# Patient Record
Sex: FEMALE | Race: WHITE | NOT HISPANIC OR LATINO | Employment: FULL TIME | URBAN - METROPOLITAN AREA
[De-identification: names, ages, dates, MRNs, and addresses within clinical notes are randomized per-mention and may not be internally consistent; named-entity substitution may affect disease eponyms.]

---

## 2018-02-05 ENCOUNTER — OFFICE VISIT (OUTPATIENT)
Dept: INTERNAL MEDICINE | Facility: CLINIC | Age: 23
End: 2018-02-05

## 2018-02-05 VITALS
HEIGHT: 63 IN | TEMPERATURE: 98 F | SYSTOLIC BLOOD PRESSURE: 110 MMHG | OXYGEN SATURATION: 98 % | HEART RATE: 82 BPM | BODY MASS INDEX: 22.86 KG/M2 | WEIGHT: 129 LBS | DIASTOLIC BLOOD PRESSURE: 70 MMHG

## 2018-02-05 DIAGNOSIS — J06.9 ACUTE URI: ICD-10-CM

## 2018-02-05 DIAGNOSIS — M25.511 ACUTE PAIN OF RIGHT SHOULDER: Primary | ICD-10-CM

## 2018-02-05 DIAGNOSIS — R10.33 PERIUMBILICAL ABDOMINAL PAIN: ICD-10-CM

## 2018-02-05 PROBLEM — L70.9 ACNE: Status: ACTIVE | Noted: 2018-02-05

## 2018-02-05 PROBLEM — J30.1 CHRONIC SEASONAL ALLERGIC RHINITIS DUE TO POLLEN: Status: ACTIVE | Noted: 2018-02-05

## 2018-02-05 PROCEDURE — 99214 OFFICE O/P EST MOD 30 MIN: CPT | Performed by: FAMILY MEDICINE

## 2018-02-05 RX ORDER — MONTELUKAST SODIUM 10 MG/1
10 TABLET ORAL
COMMUNITY
End: 2018-02-05

## 2018-02-05 RX ORDER — CETIRIZINE HYDROCHLORIDE 10 MG/1
TABLET ORAL
COMMUNITY

## 2018-02-05 RX ORDER — SPIRONOLACTONE 100 MG/1
100 TABLET, FILM COATED ORAL
COMMUNITY

## 2018-02-05 RX ORDER — NORGESTIMATE AND ETHINYL ESTRADIOL 0.25-0.035
1 KIT ORAL
COMMUNITY
End: 2020-05-04

## 2018-02-05 RX ORDER — FLUTICASONE PROPIONATE 50 MCG
2 SPRAY, SUSPENSION (ML) NASAL DAILY
COMMUNITY

## 2018-02-05 NOTE — PROGRESS NOTES
Subjective   Kathya Ho is a 22 y.o. female.   Chief Complaint   Patient presents with   • Shoulder Pain   • Facial Pain   • Nasal Congestion   • Abdominal Pain       History of Present Illness     This is new patient in our office.     #1 right shoulder pain-started 2 weeks ago.  Patient dislocated her right shoulder when she was doing weights.  She was able to get it back in place.  She has pain on and off which is achy, intensity 5 out of 10.  It is localized in the anterior arm.  It is worse with repetitive movements and when she carries her backpack.  It is not radiating.  This is the second time when she dislocated her shoulder.  Last time it was 3 years ago also when she was doing weights.    #2 abdominal pain-started 3 years ago when she started college.  It is on and off, it is localized around umbilicus.  It is achy, lasts hours, intensity 5 out of 10.  Sometimes it is associated with nausea but no vomiting.  Bowel movements are normal.  She tried Pepto-Bismol and sometimes it helped sometimes not.  She used medication for heartburn (NOS) one time and it didn't help.  Pain is not related to specific activity, sometimes it is related to stress.  Sometimes it is related to eating but not always.     #3 sinus pressure, left sided nasal drainage which is clear, sometimes yellow.  Started 3 days ago.  No other symptoms associated.  No fever, no chills, no cough.  Patient tried Flonase and it helped a little.    For more history please see health history form which was reviewed by me and will be scanned.    The following portions of the patient's history were reviewed and updated as appropriate: allergies, current medications, past family history, past medical history, past social history, past surgical history and problem list.    Review of Systems   Constitutional: Negative for chills and fever.   HENT: Positive for congestion and sinus pressure. Negative for ear pain and sore throat.    Respiratory:  Negative for cough.    Cardiovascular: Negative.    Gastrointestinal: Positive for abdominal pain and nausea. Negative for blood in stool, constipation and diarrhea.   Genitourinary: Negative.    Neurological: Negative.    Hematological: Negative.          Objective   Wt Readings from Last 3 Encounters:   02/05/18 58.5 kg (129 lb)   11/12/16 55.3 kg (122 lb) (38 %, Z= -0.30)*   09/09/16 55.8 kg (123 lb) (41 %, Z= -0.23)*     * Growth percentiles are based on [facility] 2-20 Years data.      Vitals:    02/05/18 1043   BP: 110/70   Pulse: 82   Temp: 98 °F (36.7 °C)   SpO2: 98%     Temp Readings from Last 3 Encounters:   02/05/18 98 °F (36.7 °C)   11/12/16 97.5 °F (36.4 °C) (Oral)   09/09/16 98.2 °F (36.8 °C) (Temporal Artery )     BP Readings from Last 3 Encounters:   02/05/18 110/70   11/12/16 124/70   09/09/16 106/64     Pulse Readings from Last 3 Encounters:   02/05/18 82   11/12/16 90   09/09/16 88       Physical Exam   Constitutional: She is oriented to person, place, and time. She appears well-developed and well-nourished.   HENT:   Head: Normocephalic and atraumatic.   Neck: Neck supple. Carotid bruit is not present. No thyromegaly present.   Cardiovascular: Normal rate, regular rhythm and normal heart sounds.    Pulmonary/Chest: Effort normal and breath sounds normal.   Abdominal: Soft. She exhibits no distension and no mass. There is no tenderness. No hernia.   Musculoskeletal:   Limit of extension in the right shoulder.  Mm strength 5/5 x UE. Mild TTP over Rt shoulder area.   Neurological: She is alert and oriented to person, place, and time.   Skin: Skin is warm, dry and intact.   Psychiatric: She has a normal mood and affect. Her behavior is normal.       Assessment/Plan   Kathya was seen today for shoulder pain, facial pain, nasal congestion and abdominal pain.    Diagnoses and all orders for this visit:    Acute pain of right shoulder  -     Ambulatory Referral to Physical Therapy    Periumbilical abdominal  pain    Acute URI        #1 right shoulder pain-referral to physical therapist.  Return to office if symptoms are not resolved with treatment, or sooner if worsening.      #2 abdominal pain-regular meals, start probiotics.  Stress relief techniques.  Return to clinic if symptoms are not better with treatment in 1 month, or sooner if worsening.      #3 upper respiratory infection-Mucinex 1200 mg twice a day.  Fluids.  Return to clinic if symptoms not better with treatment, or sooner if worsening.

## 2018-04-18 ENCOUNTER — TELEPHONE (OUTPATIENT)
Dept: INTERNAL MEDICINE | Facility: CLINIC | Age: 23
End: 2018-04-18

## 2018-04-18 ENCOUNTER — OFFICE VISIT (OUTPATIENT)
Dept: INTERNAL MEDICINE | Facility: CLINIC | Age: 23
End: 2018-04-18

## 2018-04-18 VITALS
BODY MASS INDEX: 22.15 KG/M2 | DIASTOLIC BLOOD PRESSURE: 70 MMHG | WEIGHT: 125 LBS | HEART RATE: 108 BPM | OXYGEN SATURATION: 98 % | TEMPERATURE: 98.8 F | HEIGHT: 63 IN | SYSTOLIC BLOOD PRESSURE: 108 MMHG

## 2018-04-18 DIAGNOSIS — R10.9 RIGHT SIDED ABDOMINAL PAIN: Primary | ICD-10-CM

## 2018-04-18 LAB

## 2018-04-18 PROCEDURE — 99213 OFFICE O/P EST LOW 20 MIN: CPT | Performed by: NURSE PRACTITIONER

## 2018-04-18 PROCEDURE — 81025 URINE PREGNANCY TEST: CPT | Performed by: NURSE PRACTITIONER

## 2018-04-18 PROCEDURE — 81003 URINALYSIS AUTO W/O SCOPE: CPT | Performed by: NURSE PRACTITIONER

## 2018-04-18 NOTE — PROGRESS NOTES
"Subjective   Kathya Ho is a 23 y.o. female here for right side abdominal pain x1 week.    History of Present Illness   The patient is here today with complaints of RLQ pain. This is different from her abd achiness from a few months ago. This improved with decreasing lactose exposure. This pain has been a dull achy pain but today she went to do some exercise on a ball and could not do that due to abd pain. Most of the time next to belly button. Denies pain btw shoulder blades. Area is tender to the touch.     Periods are regular and no problems. Moderate flow.     Mom with GB removal.     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   The following portions of the patient's history were reviewed and updated as appropriate: allergies, current medications, past family history, past medical history, past social history, past surgical history and problem list.    Review of Systems   Constitutional: Negative.    Respiratory: Negative.    Cardiovascular: Positive for chest pain (today when going up the stairs, she also reports a fear of heights. ).   Gastrointestinal: Positive for abdominal distention (\"a little bit\") and abdominal pain. Negative for anal bleeding, blood in stool, constipation, diarrhea, nausea and vomiting.       Objective   Physical Exam   Constitutional: She appears well-developed and well-nourished.   Neck: Normal range of motion. Neck supple. No thyromegaly present.   Cardiovascular: Normal rate, regular rhythm, normal heart sounds and intact distal pulses.    Pulmonary/Chest: Effort normal and breath sounds normal.   Abdominal: Soft. Normal appearance, normal aorta and bowel sounds are normal. There is tenderness in the right lower quadrant and periumbilical area.       abd most tender at right side near umbulicus   Skin: Skin is warm and dry.   Psychiatric: She has a normal mood and affect. Her behavior is normal. Judgment and thought content normal.       Assessment/Plan   There " are no diagnoses linked to this encounter.    1. Right sided abd pain- DD, colitis, appendicitis, functional bowel disorder, check CT abd/pelvis, if neg and symptoms persist we will refer to GI. Keep diary.

## 2018-04-18 NOTE — TELEPHONE ENCOUNTER
----- Message from Cecille Dugan sent at 4/18/2018  3:10 PM EDT -----  Patient wants to know the results of her urine test

## 2018-04-20 ENCOUNTER — HOSPITAL ENCOUNTER (OUTPATIENT)
Dept: CT IMAGING | Facility: HOSPITAL | Age: 23
Discharge: HOME | End: 2018-04-20
Admitting: NURSE PRACTITIONER

## 2018-04-20 PROCEDURE — 0 DIATRIZOATE MEGLUMINE & SODIUM PER 1 ML: Performed by: NURSE PRACTITIONER

## 2018-04-20 PROCEDURE — 25010000002 IOPAMIDOL 61 % SOLUTION: Performed by: NURSE PRACTITIONER

## 2018-04-20 PROCEDURE — 74177 CT ABD & PELVIS W/CONTRAST: CPT

## 2018-04-20 RX ADMIN — IOPAMIDOL 85 ML: 612 INJECTION, SOLUTION INTRAVENOUS at 10:21

## 2018-04-20 RX ADMIN — DIATRIZOATE MEGLUMINE AND DIATRIZOATE SODIUM 30 ML: 660; 100 LIQUID ORAL; RECTAL at 09:00

## 2018-04-24 DIAGNOSIS — R10.9 RIGHT SIDED ABDOMINAL PAIN: Primary | ICD-10-CM

## 2018-05-07 ENCOUNTER — OFFICE VISIT (OUTPATIENT)
Dept: INTERNAL MEDICINE | Facility: CLINIC | Age: 23
End: 2018-05-07

## 2018-05-07 ENCOUNTER — OFFICE VISIT (OUTPATIENT)
Dept: GASTROENTEROLOGY | Facility: CLINIC | Age: 23
End: 2018-05-07

## 2018-05-07 VITALS
WEIGHT: 127 LBS | TEMPERATURE: 97.9 F | OXYGEN SATURATION: 97 % | HEART RATE: 82 BPM | DIASTOLIC BLOOD PRESSURE: 60 MMHG | HEIGHT: 63 IN | SYSTOLIC BLOOD PRESSURE: 104 MMHG | BODY MASS INDEX: 22.5 KG/M2

## 2018-05-07 VITALS
DIASTOLIC BLOOD PRESSURE: 64 MMHG | WEIGHT: 127.4 LBS | TEMPERATURE: 98.2 F | BODY MASS INDEX: 22.57 KG/M2 | SYSTOLIC BLOOD PRESSURE: 110 MMHG | HEIGHT: 63 IN

## 2018-05-07 DIAGNOSIS — Z00.00 PHYSICAL EXAM, ANNUAL: Primary | ICD-10-CM

## 2018-05-07 DIAGNOSIS — R10.30 LOWER ABDOMINAL PAIN: Primary | ICD-10-CM

## 2018-05-07 LAB
ERYTHROCYTE [DISTWIDTH] IN BLOOD BY AUTOMATED COUNT: 12.3 % (ref 11.7–13)
HCT VFR BLD AUTO: 41.4 % (ref 35.6–45.5)
HGB BLD-MCNC: 14 G/DL (ref 11.9–15.5)
MCH RBC QN AUTO: 32.3 PG (ref 26.9–32)
MCHC RBC AUTO-ENTMCNC: 33.8 G/DL (ref 32.4–36.3)
MCV RBC AUTO: 95.4 FL (ref 80.5–98.2)
PLATELET # BLD AUTO: 322 10*3/MM3 (ref 140–500)
RBC # BLD AUTO: 4.34 10*6/MM3 (ref 3.9–5.2)
WBC # BLD AUTO: 6.51 10*3/MM3 (ref 4.5–10.7)

## 2018-05-07 PROCEDURE — 99395 PREV VISIT EST AGE 18-39: CPT | Performed by: FAMILY MEDICINE

## 2018-05-07 PROCEDURE — 90715 TDAP VACCINE 7 YRS/> IM: CPT | Performed by: FAMILY MEDICINE

## 2018-05-07 PROCEDURE — 90471 IMMUNIZATION ADMIN: CPT | Performed by: FAMILY MEDICINE

## 2018-05-07 PROCEDURE — 99203 OFFICE O/P NEW LOW 30 MIN: CPT | Performed by: INTERNAL MEDICINE

## 2018-05-07 NOTE — PROGRESS NOTES
Subjective   Kathya Ho is a 23 y.o. female.   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  She has no complaints.  She had problems with periumbilical abdominal pain last month.  She had abdominal CT which was negative.  She is scheduled today with GI.  Pain is significantly better.  Intensity from 0-1 out of 10.  She has no nausea, no vomiting.  Bowel movements are normal.  There is no change in surgical or family history from last office visit.  No new allergies to medications.  She is on no new prescription or over-the-counter medications.  She does not smoke cigaretts.  She never did.  She drinks alcohol occasionally.  She doesn't use drugs.  She exercises 3 times a week for 1 hour cardio and weights.  Dental appointments-she is scheduled today.  Eye exam was in November 2017.  She wears glasses.  She had GYN exam -pap, pelvic and breast exam by GYN in February 2018.  It was normal.  She is on birth control pills which are managed by her GYN.  She is up-to-date with vaccinations except of tetanus vaccine which was in April 2008.    The following portions of the patient's history were reviewed and updated as appropriate: allergies, current medications, past family history, past medical history, past social history, past surgical history and problem list.    Review of Systems   Constitutional: Negative.    HENT: Negative.    Respiratory: Negative.    Cardiovascular: Negative.    Gastrointestinal: Positive for abdominal pain. Negative for constipation, diarrhea, nausea and vomiting.   Genitourinary: Negative.    Neurological: Negative.    Psychiatric/Behavioral: Negative.          Objective   Wt Readings from Last 3 Encounters:   05/07/18 57.6 kg (127 lb)   04/18/18 56.7 kg (125 lb)   02/05/18 58.5 kg (129 lb)      Vitals:    05/07/18 0941   BP:  104/60   Pulse: 82   Temp: 97.9 °F (36.6 °C)   SpO2: 97%     Temp Readings from Last 3 Encounters:   05/07/18 97.9 °F (36.6 °C)   04/18/18 98.8 °F (37.1 °C)   02/05/18 98 °F (36.7 °C)     BP Readings from Last 3 Encounters:   05/07/18 104/60   04/18/18 108/70   02/05/18 110/70     Pulse Readings from Last 3 Encounters:   05/07/18 82   04/18/18 108   02/05/18 82       Physical Exam   Constitutional: She is oriented to person, place, and time. She appears well-developed and well-nourished. No distress.   HENT:   Head: Normocephalic and atraumatic. Hair is normal.   Right Ear: Hearing, tympanic membrane, external ear and ear canal normal. No drainage. No decreased hearing is noted.   Left Ear: Hearing, tympanic membrane, external ear and ear canal normal. No decreased hearing is noted.   Nose: No nasal deformity.   Mouth/Throat: Oropharynx is clear and moist.   Eyes: Conjunctivae, EOM and lids are normal. Pupils are equal, round, and reactive to light. Right eye exhibits no discharge. Left eye exhibits no discharge.   Neck: Normal range of motion. Neck supple. No JVD present. No tracheal deviation present. No thyromegaly present.   Cardiovascular: Normal rate, regular rhythm, normal heart sounds, intact distal pulses and normal pulses.  Exam reveals no gallop and no friction rub.    No murmur heard.  Pulmonary/Chest: Effort normal and breath sounds normal. No respiratory distress. She has no wheezes. She has no rales. She exhibits no tenderness.   Abdominal: Soft. She exhibits no distension and no mass. There is no tenderness. There is no rebound and no guarding. No hernia.   Musculoskeletal: Normal range of motion. She exhibits no edema, tenderness or deformity.   Lymphadenopathy:     She has no cervical adenopathy.   Neurological: She is alert and oriented to person, place, and time. She has normal reflexes. She displays normal reflexes. No cranial nerve deficit. She exhibits normal muscle tone. Coordination normal.    Skin: Skin is warm and dry. No rash noted. She is not diaphoretic. No erythema.   Psychiatric: She has a normal mood and affect. Her behavior is normal. Judgment and thought content normal.   Vitals reviewed.      Assessment/Plan   Kathya was seen today for annual exam.    Diagnoses and all orders for this visit:    Physical exam, annual  -     CBC (No Diff)        #1 CPE- Patient is getting tetanus vaccine and now she is up-to-date with vaccinations.  She is encouraged to continue regular exercise at least 5 days a week.  We are checking CBC as requested by her school.  Forms will be signed after results are back.

## 2018-05-07 NOTE — PROGRESS NOTES
Chief Complaint   Patient presents with   • Abdominal Pain       Kathya Ho is a 23 y.o. female who presents with Right lower quadrant pain that has resolved    The pain resolved after her final exams were concluded.      Abdominal Pain   This is a new problem. The current episode started more than 1 month ago. The onset quality is gradual. The problem occurs 2 to 4 times per day. The problem has been waxing and waning. The pain is located in the RLQ and periumbilical region. The pain is at a severity of 3/10. The pain is mild. The quality of the pain is colicky, cramping, aching and dull. The abdominal pain does not radiate. Pertinent negatives include no anorexia, arthralgias, belching, constipation, diarrhea, dysuria, fever, flatus, frequency, hematochezia, melena, myalgias, nausea, vomiting or weight loss. The pain is aggravated by certain positions. The pain is relieved by bowel movements and being still. She has tried nothing for the symptoms. The treatment provided significant relief. Prior diagnostic workup includes CT scan. There is no history of abdominal surgery, colon cancer, Crohn's disease, gallstones, GERD, irritable bowel syndrome, pancreatitis, PUD or ulcerative colitis.       Past Medical History:   Diagnosis Date   • Acne    • Allergic        Past Surgical History:   Procedure Laterality Date   • TONSILLECTOMY     • WISDOM TOOTH EXTRACTION      APPRX -15-19 Y/O         Current Outpatient Prescriptions:   •  cetirizine (zyrTEC) 10 MG tablet, Take  by mouth., Disp: , Rfl:   •  fluticasone (FLONASE) 50 MCG/ACT nasal spray, 2 sprays into each nostril Daily., Disp: , Rfl:   •  montelukast (SINGULAIR) 10 MG tablet, Take 10 mg by mouth every night., Disp: , Rfl:   •  norgestimate-ethinyl estradiol (ORTHO-CYCLEN) 0.25-35 MG-MCG per tablet, Take 1 tablet by mouth., Disp: , Rfl:   •  spironolactone (ALDACTONE) 100 MG tablet, Take 100 mg by mouth., Disp: , Rfl:     Allergies   Allergen Reactions   •  Amoxicillin Rash   • Penicillins Rash     FROM EARLY CHILDHOOD       Social History     Social History   • Marital status: Single     Spouse name: N/A   • Number of children: N/A   • Years of education: N/A     Occupational History   • Not on file.     Social History Main Topics   • Smoking status: Never Smoker   • Smokeless tobacco: Never Used   • Alcohol use No   • Drug use: No   • Sexual activity: Defer     Other Topics Concern   • Not on file     Social History Narrative   • No narrative on file       Family History   Problem Relation Age of Onset   • Hypertension Mother    • Hypertension Father    • Colon polyps Father    • No Known Problems Maternal Grandmother    • No Known Problems Maternal Grandfather    • Parkinsonism Paternal Grandmother    • Colon polyps Paternal Grandfather        Review of Systems   Constitutional: Negative for fever and weight loss.   Gastrointestinal: Positive for abdominal pain. Negative for anorexia, constipation, diarrhea, flatus, hematochezia, melena, nausea and vomiting.   Genitourinary: Negative for dysuria and frequency.   Musculoskeletal: Negative for arthralgias and myalgias.   All other systems reviewed and are negative.      Vitals:    05/07/18 1542   BP: 110/64   Temp: 98.2 °F (36.8 °C)       Physical Exam   Constitutional: She is oriented to person, place, and time. She appears well-developed and well-nourished.   HENT:   Head: Normocephalic and atraumatic.   Eyes: Pupils are equal, round, and reactive to light.   Cardiovascular: Normal rate, regular rhythm and normal heart sounds.    Pulmonary/Chest: Effort normal and breath sounds normal.   Abdominal: Soft. Bowel sounds are normal. She exhibits no shifting dullness, no distension, no pulsatile liver, no fluid wave, no abdominal bruit, no ascites, no pulsatile midline mass and no mass. There is no hepatosplenomegaly. There is no tenderness. There is no rigidity and no guarding. No hernia.   Musculoskeletal: Normal range  of motion.   Neurological: She is alert and oriented to person, place, and time.   Skin: Skin is warm and dry.   Psychiatric: She has a normal mood and affect. Her behavior is normal. Thought content normal.   Nursing note and vitals reviewed.    Problem list    Right lower quadrant and periumbilical abdominal pain which has resolved      Assessment/Plan    Out any alarm symptoms such as rectal bleeding or family history of IBD there is no indication for colonoscopy especially in a patient who is now symptom free  It is interesting that her symptoms resolved after conclusion of fine ralaleja in am wondering if she just had a bit of stress induced functional abdominal pain  I will prescribe Levsin to be used as needed in the future  She will begin a probiotic  She will follow-up with me as needed

## 2019-05-07 ENCOUNTER — OFFICE VISIT (OUTPATIENT)
Dept: INTERNAL MEDICINE | Facility: CLINIC | Age: 24
End: 2019-05-07

## 2019-05-07 VITALS
BODY MASS INDEX: 25.69 KG/M2 | OXYGEN SATURATION: 98 % | SYSTOLIC BLOOD PRESSURE: 120 MMHG | HEIGHT: 63 IN | DIASTOLIC BLOOD PRESSURE: 72 MMHG | HEART RATE: 96 BPM | WEIGHT: 145 LBS | TEMPERATURE: 98.4 F

## 2019-05-07 DIAGNOSIS — R00.0 TACHYCARDIA: ICD-10-CM

## 2019-05-07 DIAGNOSIS — Z00.00 PHYSICAL EXAM, ANNUAL: Primary | ICD-10-CM

## 2019-05-07 PROCEDURE — 99395 PREV VISIT EST AGE 18-39: CPT | Performed by: FAMILY MEDICINE

## 2019-05-07 PROCEDURE — 93000 ELECTROCARDIOGRAM COMPLETE: CPT | Performed by: FAMILY MEDICINE

## 2019-05-07 PROCEDURE — 99213 OFFICE O/P EST LOW 20 MIN: CPT | Performed by: FAMILY MEDICINE

## 2019-05-07 NOTE — PROGRESS NOTES
Subjective   Kathya Ho is a 24 y.o. female.   Chief Complaint   Patient presents with   • Rapid Heart Rate   • Annual Exam       History of Present Illness     Patient is here today because of fast heart rate and for complete physical exam with GYN evaluation.  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    #1 CPE-no complaints, other than fast heart rate.  There is no change in medical, surgical or family history from last office visit.  There is no change in prescription medications.  Over-the-counter she takes vitamin C as needed and calcium.  Recently she started taking CBD oil to help with relaxation and it does help her.  No change in allergies to medications.  She is allergic to penicillin.  She does not smoke cigarettes.  She drinks alcohol only occasionally -1 glass of wine a week.  No drugs.  She exercises 4 days a week for an hour.  She has dental appointments every 6 months.  Last eye exam was in January 2019.  She wears glasses.  She had GYN evaluation in February 2019 which was normal.  She had tetanus vaccine in May 2018.    #2 fast heart rate- patient noticed it in the last week of February.  She had sinus infection and she was preparing for exams.  It was midterm week.  She woke up from her sleep.  She was jittery and she checked her heart rate and it was at 170.  She did not have any other symptoms associated with it.  No chest pain, no shortness of breath, no lightheadedness.  She reports that her baseline heart rate is between 70 and 90.  She was on Z-Tiago at that time for sinus infection and the night before she had Mucinex DM.  Since then she started checking her heart rate randomly with her watch.  She noticed that it can go up to 100, and one time it was at 120 when she was sitting in her class.  Asymptomatic.  She does not have any caffeine in her diet, except for the time when she studies for exams.  Mother has hypothyroidism.    The following portions of the patient's history  were reviewed and updated as appropriate: allergies, current medications, past family history, past medical history, past social history, past surgical history and problem list.    Review of Systems   Constitutional: Negative.    HENT: Negative.    Respiratory: Negative.    Cardiovascular: Negative for chest pain.   Gastrointestinal: Negative.    Neurological: Negative.          Objective   Wt Readings from Last 3 Encounters:   05/07/19 65.8 kg (145 lb)   05/07/18 57.8 kg (127 lb 6.4 oz)   05/07/18 57.6 kg (127 lb)      Vitals:    05/07/19 0721   BP: 120/72   Pulse: 96   Temp: 98.4 °F (36.9 °C)   SpO2: 98%     Temp Readings from Last 3 Encounters:   05/07/19 98.4 °F (36.9 °C)   05/07/18 98.2 °F (36.8 °C) (Oral)   05/07/18 97.9 °F (36.6 °C)     BP Readings from Last 3 Encounters:   05/07/19 120/72   05/07/18 110/64   05/07/18 104/60     Pulse Readings from Last 3 Encounters:   05/07/19 96   05/07/18 82   04/18/18 108       Physical Exam   Constitutional: She is oriented to person, place, and time. She appears well-developed and well-nourished. No distress.   HENT:   Head: Normocephalic and atraumatic. Hair is normal.   Right Ear: Hearing, tympanic membrane, external ear and ear canal normal. No drainage. No decreased hearing is noted.   Left Ear: Hearing, tympanic membrane, external ear and ear canal normal. No decreased hearing is noted.   Nose: No nasal deformity.   Mouth/Throat: Oropharynx is clear and moist.   Eyes: Conjunctivae, EOM and lids are normal. Pupils are equal, round, and reactive to light. Right eye exhibits no discharge. Left eye exhibits no discharge.   Neck: Normal range of motion. Neck supple. No JVD present. No tracheal deviation present. No thyromegaly present.   Cardiovascular: Normal rate, regular rhythm, normal heart sounds, intact distal pulses and normal pulses. Exam reveals no gallop and no friction rub.   No murmur heard.  Pulmonary/Chest: Effort normal and breath sounds normal. No  respiratory distress. She has no wheezes. She has no rales. She exhibits no tenderness.   Abdominal: Soft. She exhibits no distension and no mass. There is no tenderness. There is no rebound and no guarding. No hernia.   Musculoskeletal: Normal range of motion. She exhibits no edema, tenderness or deformity.   Lymphadenopathy:     She has no cervical adenopathy.   Neurological: She is alert and oriented to person, place, and time. She has normal reflexes. She displays normal reflexes. No cranial nerve deficit. She exhibits normal muscle tone. Coordination normal.   Skin: Skin is warm and dry. No rash noted. She is not diaphoretic. No erythema.   Psychiatric: She has a normal mood and affect. Her behavior is normal. Judgment and thought content normal.   Vitals reviewed.      Assessment/Plan   Kathya was seen today for rapid heart rate and annual exam.    Diagnoses and all orders for this visit:    Physical exam, annual  -     CBC (No Diff)  -     Urinalysis without microscopic (no culture) - Urine, Clean Catch    Tachycardia  -     Thyroid Cascade Profile  -     ECG 12 Lead        #1 CPE-patient is up-to-date with vaccinations.  We are checking CBC and UA as requested by her school.  She will continue regular exercise.  Will complete form for school after tests are back.    2.  Fast heart rate- I think it can be secondary to Mucinex DM and caffeine that she had during exams week.  Normally she does not use any caffeine.  It was asymptomatic.  Will check TSH.  I am advising patient not to use caffeine.  EKG in the office- sinus arrhythmia, no other abnormalities, tracing and interpretation will be scanned. RTC if worsening or if any additional symptoms present.

## 2019-05-08 LAB
APPEARANCE UR: CLEAR
BILIRUB UR QL STRIP: NEGATIVE
COLOR UR: YELLOW
ERYTHROCYTE [DISTWIDTH] IN BLOOD BY AUTOMATED COUNT: 12.1 % (ref 12.3–15.4)
GLUCOSE UR QL: NEGATIVE
HCT VFR BLD AUTO: 41.2 % (ref 34–46.6)
HGB BLD-MCNC: 13.2 G/DL (ref 12–15.9)
HGB UR QL STRIP: NEGATIVE
KETONES UR QL STRIP: NEGATIVE
LEUKOCYTE ESTERASE UR QL STRIP: NEGATIVE
MCH RBC QN AUTO: 31.1 PG (ref 26.6–33)
MCHC RBC AUTO-ENTMCNC: 32 G/DL (ref 31.5–35.7)
MCV RBC AUTO: 96.9 FL (ref 79–97)
NITRITE UR QL STRIP: NEGATIVE
PH UR STRIP: 7 [PH] (ref 5–8)
PLATELET # BLD AUTO: 331 10*3/MM3 (ref 140–450)
PROT UR QL STRIP: NEGATIVE
RBC # BLD AUTO: 4.25 10*6/MM3 (ref 3.77–5.28)
SP GR UR: 1.01 (ref 1–1.03)
TSH SERPL DL<=0.005 MIU/L-ACNC: 2.26 UIU/ML (ref 0.45–4.5)
UROBILINOGEN UR STRIP-MCNC: NORMAL MG/DL
WBC # BLD AUTO: 6.25 10*3/MM3 (ref 3.4–10.8)

## 2019-08-12 ENCOUNTER — OFFICE VISIT (OUTPATIENT)
Dept: GASTROENTEROLOGY | Facility: CLINIC | Age: 24
End: 2019-08-12

## 2019-08-12 VITALS
WEIGHT: 145.6 LBS | SYSTOLIC BLOOD PRESSURE: 118 MMHG | DIASTOLIC BLOOD PRESSURE: 66 MMHG | HEIGHT: 63 IN | TEMPERATURE: 98.3 F | BODY MASS INDEX: 25.8 KG/M2

## 2019-08-12 DIAGNOSIS — R10.30 LOWER ABDOMINAL PAIN: Primary | ICD-10-CM

## 2019-08-12 PROCEDURE — 99213 OFFICE O/P EST LOW 20 MIN: CPT | Performed by: INTERNAL MEDICINE

## 2019-08-12 RX ORDER — SIMETHICONE 125 MG
125 TABLET,CHEWABLE ORAL EVERY 6 HOURS PRN
COMMUNITY
End: 2020-05-04

## 2019-08-12 RX ORDER — ASCORBIC ACID 500 MG
500 TABLET ORAL DAILY
COMMUNITY

## 2019-08-12 NOTE — PROGRESS NOTES
Chief Complaint   Patient presents with   • Abdominal Pain   • Anorexia       Kathya Ho is a  24 y.o. female here for a follow up visit for functional abdominal pain syndrome    Over the last year she has had intermittent abdominal pain which is well controlled with Levsin, likely due to stress.  She is in her clinicals from physical therapy school.  No alarm symptoms such as rectal bleeding or weight loss.  No vomiting.  Her last flare was 1 week ago it has resolved she is pain-free now        Past Medical History:   Diagnosis Date   • Acne    • Allergic rhinitis        Past Surgical History:   Procedure Laterality Date   • TONSILLECTOMY     • WISDOM TOOTH EXTRACTION      APPRX -[age] Y/O       Scheduled Meds:    Continuous Infusions:  No current facility-administered medications for this visit.     PRN Meds:.    Allergies   Allergen Reactions   • Amoxicillin Rash   • Penicillins Rash     FROM EARLY CHILDHOOD       Social History     Socioeconomic History   • Marital status: Single     Spouse name: Not on file   • Number of children: Not on file   • Years of education: Not on file   • Highest education level: Not on file   Tobacco Use   • Smoking status: Never Smoker   • Smokeless tobacco: Never Used   Substance and Sexual Activity   • Alcohol use: Yes     Comment: Social   • Drug use: No   • Sexual activity: Defer       Family History   Problem Relation Age of Onset   • Hypertension Mother    • Hypertension Father    • Colon polyps Father    • No Known Problems Maternal Grandmother    • No Known Problems Maternal Grandfather    • Parkinsonism Paternal Grandmother    • Colon polyps Paternal Grandfather        Review of Systems   Gastrointestinal: Positive for abdominal pain.   All other systems reviewed and are negative.      Vitals:    08/12/19 1418   BP: 118/66   Temp: 98.3 °F (36.8 °C)       Physical Exam   Constitutional: She is oriented to person, place, and time. She appears well-developed and  well-nourished.   HENT:   Head: Normocephalic and atraumatic.   Eyes: Pupils are equal, round, and reactive to light.   Cardiovascular: Normal rate, regular rhythm and normal heart sounds.   Pulmonary/Chest: Effort normal and breath sounds normal.   Abdominal: Soft. Bowel sounds are normal. She exhibits no shifting dullness, no distension, no pulsatile liver, no fluid wave, no abdominal bruit, no ascites, no pulsatile midline mass and no mass. There is no hepatosplenomegaly. There is tenderness. There is no rigidity and no guarding. No hernia.   Musculoskeletal: Normal range of motion.   Neurological: She is alert and oriented to person, place, and time.   Skin: Skin is warm and dry.   Psychiatric: She has a normal mood and affect. Her behavior is normal. Thought content normal.   Nursing note and vitals reviewed.    Problem list    Occasional lower abdominal pain  Functional abdominal pain syndrome      Assessment/Plan    No alarm symptoms requiring colonoscopy  CAT scan 1 year ago was normal  No need to repeat this  No need for blood testing today  Continue hyoscyamine as needed for pain with the addition of IBgard also as needed samples given  Office visit 1 year

## 2020-05-04 ENCOUNTER — OFFICE VISIT (OUTPATIENT)
Dept: INTERNAL MEDICINE | Facility: CLINIC | Age: 25
End: 2020-05-04

## 2020-05-04 VITALS
BODY MASS INDEX: 27.11 KG/M2 | WEIGHT: 153 LBS | DIASTOLIC BLOOD PRESSURE: 70 MMHG | SYSTOLIC BLOOD PRESSURE: 100 MMHG | OXYGEN SATURATION: 99 % | TEMPERATURE: 98.6 F | HEART RATE: 76 BPM | HEIGHT: 63 IN

## 2020-05-04 DIAGNOSIS — Z00.00 PHYSICAL EXAM, ANNUAL: Primary | ICD-10-CM

## 2020-05-04 DIAGNOSIS — Z00.00 ANNUAL PHYSICAL EXAM: ICD-10-CM

## 2020-05-04 LAB
B-HCG UR QL: NEGATIVE
BILIRUB BLD-MCNC: NEGATIVE MG/DL
CLARITY, POC: CLEAR
COLOR UR: YELLOW
GLUCOSE UR STRIP-MCNC: NEGATIVE MG/DL
HCT VFR BLD AUTO: 39.7 % (ref 34–46.6)
HGB BLD-MCNC: 13.8 G/DL (ref 12–15.9)
INTERNAL NEGATIVE CONTROL: NEGATIVE
INTERNAL POSITIVE CONTROL: POSITIVE
KETONES UR QL: NEGATIVE
LEUKOCYTE EST, POC: NEGATIVE
Lab: NORMAL
NITRITE UR-MCNC: NEGATIVE MG/ML
PH UR: 7 [PH] (ref 5–8)
PROT UR STRIP-MCNC: NEGATIVE MG/DL
RBC # UR STRIP: NEGATIVE /UL
SP GR UR: 1.01 (ref 1–1.03)
UROBILINOGEN UR QL: NORMAL

## 2020-05-04 PROCEDURE — 81003 URINALYSIS AUTO W/O SCOPE: CPT | Performed by: FAMILY MEDICINE

## 2020-05-04 PROCEDURE — 81025 URINE PREGNANCY TEST: CPT | Performed by: FAMILY MEDICINE

## 2020-05-04 PROCEDURE — 99395 PREV VISIT EST AGE 18-39: CPT | Performed by: FAMILY MEDICINE

## 2020-05-04 NOTE — PROGRESS NOTES
Subjective   Kathya Ho is a 25 y.o. female.   Chief Complaint   Patient presents with   • Annual Exam       History of Present Illness     #1 CPE-patient is here for complete physical exam without GYN evaluation.  She needs paperwork done for PT school.  She will be starting clinicals in a month.  She has no complaints.  There is no change in medical, surgical or family history from last office visit.  She is on IBguard and Levsin as needed for IBS.  Symptoms are controlled.  She follows up with GI.  She saw a counselor few months ago.  She was diagnosed with borderline anxiety, no medications were recommended.  She reports that anxiety is controlled at this time.  Birth control pills were stopped and she had IUD placed in March 2020.  She had spotting for about 4 weeks and no period since then.  She would like to have pregnancy test done.  She is sexually active.  Condoms are used, but inconsistently.  She follows up with GYN.  She had Pap smear and breast exam in February 2020 by her GYN.  She reports no new allergies to medications.  She does not use tobacco products.  She drinks alcohol once a week or less.  No drugs.  She exercises 4 to 5 days a week for at least 45 minutes.  She does cardio exercise and weights.  Last dental appointment was more than a year ago.  Her vision is good.  She had tetanus vaccine in May 2018.    The following portions of the patient's history were reviewed and updated as appropriate: allergies, current medications, past family history, past medical history, past social history, past surgical history and problem list.    Review of Systems   Constitutional: Negative for chills and fever.   HENT: Negative for congestion, postnasal drip and sore throat.    Respiratory: Negative for cough, shortness of breath and wheezing.    Cardiovascular: Negative for chest pain.   Gastrointestinal: Negative for blood in stool.   Genitourinary: Negative for dysuria and hematuria.   Neurological:  Negative for dizziness.   Psychiatric/Behavioral: Negative.          Objective   Wt Readings from Last 3 Encounters:   05/04/20 69.4 kg (153 lb)   08/12/19 66 kg (145 lb 9.6 oz)   05/07/19 65.8 kg (145 lb)      Vitals:    05/04/20 0952   BP: 100/70   Pulse: 76   Temp: 98.6 °F (37 °C)   SpO2: 99%     Temp Readings from Last 3 Encounters:   05/04/20 98.6 °F (37 °C)   08/12/19 98.3 °F (36.8 °C)   05/07/19 98.4 °F (36.9 °C)     BP Readings from Last 3 Encounters:   05/04/20 100/70   08/12/19 118/66   05/07/19 120/72     Pulse Readings from Last 3 Encounters:   05/04/20 76   05/07/19 96   05/07/18 82     Body mass index is 27.11 kg/m².    Physical Exam   Constitutional: She is oriented to person, place, and time. She appears well-developed and well-nourished. No distress.   HENT:   Head: Normocephalic and atraumatic. Hair is normal.   Right Ear: Hearing, tympanic membrane, external ear and ear canal normal. No drainage. No decreased hearing is noted.   Left Ear: Hearing, tympanic membrane, external ear and ear canal normal. No decreased hearing is noted.   Nose: No nasal deformity.   Mouth/Throat: Oropharynx is clear and moist.   Eyes: Pupils are equal, round, and reactive to light. Conjunctivae, EOM and lids are normal. Right eye exhibits no discharge. Left eye exhibits no discharge.   Neck: Normal range of motion. Neck supple. No JVD present. No tracheal deviation present. No thyromegaly present.   Cardiovascular: Normal rate, regular rhythm, normal heart sounds, intact distal pulses and normal pulses. Exam reveals no gallop and no friction rub.   No murmur heard.  Pulmonary/Chest: Effort normal and breath sounds normal. No respiratory distress. She has no wheezes. She has no rales. She exhibits no tenderness.   Abdominal: Soft. She exhibits no distension and no mass. There is no tenderness. There is no rebound and no guarding. No hernia.   Musculoskeletal: Normal range of motion. She exhibits no edema, tenderness or  deformity.   Lymphadenopathy:     She has no cervical adenopathy.   Neurological: She is alert and oriented to person, place, and time. She has normal reflexes. She displays normal reflexes. No cranial nerve deficit. She exhibits normal muscle tone. Coordination normal.   Skin: Skin is warm and dry. No rash noted. She is not diaphoretic. No erythema.   Psychiatric: She has a normal mood and affect. Her behavior is normal. Judgment and thought content normal.   Vitals reviewed.      Assessment/Plan   Kathya was seen today for annual exam.    Diagnoses and all orders for this visit:    Physical exam, annual  -     Hemoglobin and hematocrit, blood        #1 CPE-preventive counseling provided.  Patient is advised to schedule office visits with a dentist.  Sun protection discussed.  Exercise at least 30 minutes a, 5 days a week.  She is advised to stop CBD oil.  We do not know safety of it.  We are checking H&H as requested by her school.  Forms for school done.  Pregnancy test is negative.

## 2020-09-08 NOTE — TELEPHONE ENCOUNTER
[facility] [address] [place]   Phone Number: [phone]             Pt called  needs med refill  [place] tel appt. [place] . Pharm. [facility] -[address] in [facility], [address]- [phone]   [phone] -   hyoscyamine (LEVSIN) 0.125 MG SL tablet 60 tablet     8/12/2019   Sig - Route: Take 1 tablet by mouth Every 4 (Four) Hours As Needed for Cramping. ** Please make an appointment for further refills. Thank you. - Oral

## 2020-09-28 ENCOUNTER — OFFICE VISIT (OUTPATIENT)
Dept: GASTROENTEROLOGY | Facility: CLINIC | Age: 25
End: 2020-09-28

## 2020-09-28 VITALS — WEIGHT: 150 LBS | BODY MASS INDEX: 26.58 KG/M2 | HEIGHT: 63 IN

## 2020-09-28 DIAGNOSIS — R10.10 PAIN OF UPPER ABDOMEN: Primary | ICD-10-CM

## 2020-09-28 DIAGNOSIS — K62.5 RECTAL BLEEDING: ICD-10-CM

## 2020-09-28 PROCEDURE — 99443 PR PHYS/QHP TELEPHONE EVALUATION 21-30 MIN: CPT | Performed by: NURSE PRACTITIONER

## 2020-10-13 PROBLEM — K62.5 RECTAL BLEEDING: Status: ACTIVE | Noted: 2020-10-13

## 2020-10-13 PROBLEM — R10.10 PAIN OF UPPER ABDOMEN: Status: ACTIVE | Noted: 2020-10-13

## 2020-10-26 ENCOUNTER — TRANSCRIBE ORDERS (OUTPATIENT)
Dept: ADMINISTRATIVE | Facility: HOSPITAL | Age: 25
End: 2020-10-26

## 2020-10-26 DIAGNOSIS — Z01.818 OTHER SPECIFIED PRE-OPERATIVE EXAMINATION: Primary | ICD-10-CM

## 2020-12-04 ENCOUNTER — LAB (OUTPATIENT)
Dept: LAB | Facility: HOSPITAL | Age: 25
End: 2020-12-04

## 2020-12-04 DIAGNOSIS — Z01.818 OTHER SPECIFIED PRE-OPERATIVE EXAMINATION: ICD-10-CM

## 2020-12-04 PROCEDURE — U0004 COV-19 TEST NON-CDC HGH THRU: HCPCS

## 2020-12-04 PROCEDURE — C9803 HOPD COVID-19 SPEC COLLECT: HCPCS

## 2020-12-05 LAB — SARS-COV-2 RNA RESP QL NAA+PROBE: NOT DETECTED

## 2020-12-07 ENCOUNTER — HOSPITAL ENCOUNTER (OUTPATIENT)
Facility: HOSPITAL | Age: 25
Setting detail: HOSPITAL OUTPATIENT SURGERY
Discharge: HOME | End: 2020-12-07
Attending: INTERNAL MEDICINE | Admitting: INTERNAL MEDICINE

## 2020-12-07 ENCOUNTER — ANESTHESIA EVENT (OUTPATIENT)
Dept: GASTROENTEROLOGY | Facility: HOSPITAL | Age: 25
End: 2020-12-07

## 2020-12-07 ENCOUNTER — ANESTHESIA (OUTPATIENT)
Dept: GASTROENTEROLOGY | Facility: HOSPITAL | Age: 25
End: 2020-12-07

## 2020-12-07 VITALS
OXYGEN SATURATION: 98 % | HEIGHT: 63 IN | DIASTOLIC BLOOD PRESSURE: 76 MMHG | SYSTOLIC BLOOD PRESSURE: 112 MMHG | BODY MASS INDEX: 25.96 KG/M2 | WEIGHT: 146.5 LBS | RESPIRATION RATE: 18 BRPM | HEART RATE: 80 BPM

## 2020-12-07 DIAGNOSIS — R10.10 PAIN OF UPPER ABDOMEN: ICD-10-CM

## 2020-12-07 DIAGNOSIS — K62.5 RECTAL BLEEDING: ICD-10-CM

## 2020-12-07 LAB
B-HCG UR QL: NEGATIVE
INTERNAL NEGATIVE CONTROL: NEGATIVE
INTERNAL POSITIVE CONTROL: POSITIVE
Lab: NORMAL

## 2020-12-07 PROCEDURE — 25010000002 PROPOFOL 10 MG/ML EMULSION: Performed by: NURSE ANESTHETIST, CERTIFIED REGISTERED

## 2020-12-07 PROCEDURE — 81025 URINE PREGNANCY TEST: CPT | Performed by: INTERNAL MEDICINE

## 2020-12-07 PROCEDURE — 43239 EGD BIOPSY SINGLE/MULTIPLE: CPT | Performed by: INTERNAL MEDICINE

## 2020-12-07 PROCEDURE — 88305 TISSUE EXAM BY PATHOLOGIST: CPT | Performed by: INTERNAL MEDICINE

## 2020-12-07 PROCEDURE — 45380 COLONOSCOPY AND BIOPSY: CPT | Performed by: INTERNAL MEDICINE

## 2020-12-07 RX ORDER — SODIUM CHLORIDE, SODIUM LACTATE, POTASSIUM CHLORIDE, CALCIUM CHLORIDE 600; 310; 30; 20 MG/100ML; MG/100ML; MG/100ML; MG/100ML
1000 INJECTION, SOLUTION INTRAVENOUS CONTINUOUS
Status: DISCONTINUED | OUTPATIENT
Start: 2020-12-07 | End: 2020-12-07 | Stop reason: HOSPADM

## 2020-12-07 RX ORDER — LIDOCAINE HYDROCHLORIDE 20 MG/ML
INJECTION, SOLUTION INFILTRATION; PERINEURAL AS NEEDED
Status: DISCONTINUED | OUTPATIENT
Start: 2020-12-07 | End: 2020-12-07 | Stop reason: SURG

## 2020-12-07 RX ORDER — PROPOFOL 10 MG/ML
VIAL (ML) INTRAVENOUS CONTINUOUS PRN
Status: DISCONTINUED | OUTPATIENT
Start: 2020-12-07 | End: 2020-12-07 | Stop reason: SURG

## 2020-12-07 RX ORDER — PROPOFOL 10 MG/ML
VIAL (ML) INTRAVENOUS AS NEEDED
Status: DISCONTINUED | OUTPATIENT
Start: 2020-12-07 | End: 2020-12-07 | Stop reason: SURG

## 2020-12-07 RX ORDER — SODIUM CHLORIDE, SODIUM LACTATE, POTASSIUM CHLORIDE, CALCIUM CHLORIDE 600; 310; 30; 20 MG/100ML; MG/100ML; MG/100ML; MG/100ML
INJECTION, SOLUTION INTRAVENOUS CONTINUOUS PRN
Status: DISCONTINUED | OUTPATIENT
Start: 2020-12-07 | End: 2020-12-07 | Stop reason: SURG

## 2020-12-07 RX ADMIN — PROPOFOL 100 MG: 10 INJECTION, EMULSION INTRAVENOUS at 10:19

## 2020-12-07 RX ADMIN — PROPOFOL 50 MG: 10 INJECTION, EMULSION INTRAVENOUS at 10:22

## 2020-12-07 RX ADMIN — LIDOCAINE HYDROCHLORIDE 60 MG: 20 INJECTION, SOLUTION INFILTRATION; PERINEURAL at 10:19

## 2020-12-07 RX ADMIN — PROPOFOL 100 MG: 10 INJECTION, EMULSION INTRAVENOUS at 10:20

## 2020-12-07 RX ADMIN — SODIUM CHLORIDE, POTASSIUM CHLORIDE, SODIUM LACTATE AND CALCIUM CHLORIDE: 600; 310; 30; 20 INJECTION, SOLUTION INTRAVENOUS at 09:51

## 2020-12-07 RX ADMIN — SODIUM CHLORIDE, POTASSIUM CHLORIDE, SODIUM LACTATE AND CALCIUM CHLORIDE 1000 ML: 600; 310; 30; 20 INJECTION, SOLUTION INTRAVENOUS at 09:16

## 2020-12-07 RX ADMIN — PROPOFOL 300 MCG/KG/MIN: 10 INJECTION, EMULSION INTRAVENOUS at 10:19

## 2020-12-07 NOTE — ANESTHESIA POSTPROCEDURE EVALUATION
"Patient: Kathya Ho    Procedure Summary     Date: 12/07/20 Room / Location:  [facility]    Anesthesia Start: 1014 Anesthesia Stop: 1042    Procedures:       ESOPHAGOGASTRODUODENOSCOPY WITH BIOPSIES (N/A Esophagus)      COLONOSCOPY TO CECUM WITH BIOPSIES (N/A ) Diagnosis:       Pain of upper abdomen      Rectal bleeding      (Pain of upper abdomen [R10.10])      (Rectal bleeding [K62.5])    Surgeon: Qasim Cantor MD Provider: Raymon Tyler MD    Anesthesia Type: MAC ASA Status: 2          Anesthesia Type: MAC    Vitals  Vitals Value Taken Time   /76 12/07/20 1105   Temp     Pulse 80 12/07/20 1105   Resp 18 12/07/20 1105   SpO2 98 % 12/07/20 1105           Post Anesthesia Care and Evaluation    Patient location during evaluation: PACU  Patient participation: complete - patient participated  Level of consciousness: awake  Pain score: 0  Pain management: adequate  Airway patency: patent  Anesthetic complications: No anesthetic complications  PONV Status: none  Cardiovascular status: acceptable  Respiratory status: acceptable  Hydration status: acceptable    Comments: /76 (BP Location: Left arm, Patient Position: Lying)   Pulse 80   Resp 18   Ht 160 cm (63\")   Wt 66.5 kg (146 lb 8 oz)   LMP 03/07/2020   SpO2 98%   BMI 25.95 kg/m²       "

## 2020-12-07 NOTE — H&P
"[facility]  Pre Procedure History & Physical    Chief Complaint:   Time for my colonoscopy    Subjective     HPI:   25 y.o. female     Past Medical History:   Past Medical History:   Diagnosis Date   • Acne    • Allergic rhinitis    • Alopecia        Family History:  Family History   Problem Relation Age of Onset   • Hypertension Mother    • Hypertension Father    • Colon polyps Father    • No Known Problems Maternal Grandmother    • No Known Problems Maternal Grandfather    • Parkinsonism Paternal Grandmother    • Colon polyps Paternal Grandfather        Social History:   reports that she has never smoked. She has never used smokeless tobacco. She reports current alcohol use. She reports that she does not use drugs.    Medications:   Medications Prior to Admission   Medication Sig Dispense Refill Last Dose   • hyoscyamine (LEVSIN) 0.125 MG SL tablet Take 1 tablet by mouth Every 6 (Six) Hours As Needed for Cramping. 120 tablet 3 Past Week at Unknown time   • spironolactone (ALDACTONE) 100 MG tablet Take 100 mg by mouth.   Past Week at Unknown time   • CALCIUM PO Take  by mouth.      • cetirizine (zyrTEC) 10 MG tablet Take  by mouth.      • fluticasone (FLONASE) 50 MCG/ACT nasal spray 2 sprays into each nostril Daily.      • levonorgestrel (KYLEENA) 19.5 MG intrauterine device IUD 1 each by Intrauterine route.      • montelukast (SINGULAIR) 10 MG tablet Take 10 mg by mouth every night.      • NON FORMULARY As Needed. IB guard      • Probiotic Product (PROBIOTIC PO) Take  by mouth.      • vitamin C (ASCORBIC ACID) 500 MG tablet Take 500 mg by mouth Daily.          Allergies:  Amoxicillin and Penicillins    ROS:    Pertinent items are noted in HPI     Objective     Blood pressure 124/78, pulse 97, resp. rate 16, height 160 cm (63\"), weight 66.5 kg (146 lb 8 oz), last menstrual period 03/07/2020, SpO2 99 %, not currently breastfeeding.    Physical Exam   Constitutional: Pt is oriented to person, " place, and time and well-developed, well-nourished, and in no distress.   HENT:   Mouth/Throat: Oropharynx is clear and moist.   Neck: Normal range of motion. Neck supple.   Cardiovascular: Normal rate, regular rhythm and normal heart sounds.    Pulmonary/Chest: Effort normal and breath sounds normal. No respiratory distress. No  wheezes.   Abdominal: Soft. Bowel sounds are normal.   Skin: Skin is warm and dry.   Psychiatric: Mood, memory, affect and judgment normal.     Assessment/Plan     Diagnosis:  Encounter for screening for colon cancer/rectal bleed    Anticipated Surgical Procedure:  Colonoscopy    The risks, benefits, and alternatives of this procedure have been discussed with the patient or the responsible party- the patient understands and agrees to proceed.

## 2020-12-07 NOTE — ANESTHESIA PREPROCEDURE EVALUATION
Anesthesia Evaluation     Patient summary reviewed and Nursing notes reviewed   NPO Solid Status: > 8 hours  NPO Liquid Status: > 8 hours           Airway   Mallampati: I  TM distance: >3 FB  Neck ROM: full  No difficulty expected  Dental - normal exam     Pulmonary - negative pulmonary ROS and normal exam   Cardiovascular - negative cardio ROS and normal exam        Neuro/Psych- negative ROS  GI/Hepatic/Renal/Endo    (+)  GI bleeding ,     Musculoskeletal (-) negative ROS    Abdominal  - normal exam    Bowel sounds: normal.   Substance History - negative use     OB/GYN negative ob/gyn ROS         Other                      Anesthesia Plan    ASA 2     MAC       Anesthetic plan, all risks, benefits, and alternatives have been provided, discussed and informed consent has been obtained with: patient.

## 2020-12-08 LAB
LAB AP CASE REPORT: NORMAL
PATH REPORT.FINAL DX SPEC: NORMAL
PATH REPORT.GROSS SPEC: NORMAL

## 2020-12-10 ENCOUNTER — OFFICE VISIT (OUTPATIENT)
Dept: INTERNAL MEDICINE | Facility: CLINIC | Age: 25
End: 2020-12-10

## 2020-12-10 VITALS
BODY MASS INDEX: 26.58 KG/M2 | TEMPERATURE: 97.3 F | SYSTOLIC BLOOD PRESSURE: 106 MMHG | WEIGHT: 150 LBS | HEIGHT: 63 IN | DIASTOLIC BLOOD PRESSURE: 70 MMHG

## 2020-12-10 DIAGNOSIS — R00.0 TACHYCARDIA: Primary | ICD-10-CM

## 2020-12-10 DIAGNOSIS — F41.9 ANXIETY: ICD-10-CM

## 2020-12-10 DIAGNOSIS — L65.9 ALOPECIA: ICD-10-CM

## 2020-12-10 PROBLEM — K58.8 OTHER IRRITABLE BOWEL SYNDROME: Status: ACTIVE | Noted: 2020-12-10

## 2020-12-10 LAB
ALBUMIN SERPL-MCNC: 4.9 G/DL (ref 3.5–5.2)
ALBUMIN/GLOB SERPL: 2.1 G/DL
ALP SERPL-CCNC: 114 U/L (ref 39–117)
ALT SERPL-CCNC: 18 U/L (ref 1–33)
AST SERPL-CCNC: 20 U/L (ref 1–32)
BASOPHILS # BLD AUTO: 0.03 10*3/MM3 (ref 0–0.2)
BASOPHILS NFR BLD AUTO: 0.6 % (ref 0–1.5)
BILIRUB SERPL-MCNC: 0.5 MG/DL (ref 0–1.2)
BUN SERPL-MCNC: 10 MG/DL (ref 6–20)
BUN/CREAT SERPL: 11.9 (ref 7–25)
CALCIUM SERPL-MCNC: 9.9 MG/DL (ref 8.6–10.5)
CHLORIDE SERPL-SCNC: 104 MMOL/L (ref 98–107)
CO2 SERPL-SCNC: 27.6 MMOL/L (ref 22–29)
CREAT SERPL-MCNC: 0.84 MG/DL (ref 0.57–1)
EOSINOPHIL # BLD AUTO: 0.1 10*3/MM3 (ref 0–0.4)
EOSINOPHIL NFR BLD AUTO: 1.9 % (ref 0.3–6.2)
ERYTHROCYTE [DISTWIDTH] IN BLOOD BY AUTOMATED COUNT: 11.5 % (ref 12.3–15.4)
GLOBULIN SER CALC-MCNC: 2.3 GM/DL
GLUCOSE SERPL-MCNC: 91 MG/DL (ref 65–99)
HCT VFR BLD AUTO: 42.9 % (ref 34–46.6)
HGB BLD-MCNC: 14.6 G/DL (ref 12–15.9)
IMM GRANULOCYTES # BLD AUTO: 0.01 10*3/MM3 (ref 0–0.05)
IMM GRANULOCYTES NFR BLD AUTO: 0.2 % (ref 0–0.5)
LYMPHOCYTES # BLD AUTO: 1.25 10*3/MM3 (ref 0.7–3.1)
LYMPHOCYTES NFR BLD AUTO: 23.9 % (ref 19.6–45.3)
MAGNESIUM SERPL-MCNC: 2.1 MG/DL (ref 1.6–2.6)
MCH RBC QN AUTO: 32.2 PG (ref 26.6–33)
MCHC RBC AUTO-ENTMCNC: 34 G/DL (ref 31.5–35.7)
MCV RBC AUTO: 94.5 FL (ref 79–97)
MONOCYTES # BLD AUTO: 0.47 10*3/MM3 (ref 0.1–0.9)
MONOCYTES NFR BLD AUTO: 9 % (ref 5–12)
NEUTROPHILS # BLD AUTO: 3.36 10*3/MM3 (ref 1.7–7)
NEUTROPHILS NFR BLD AUTO: 64.4 % (ref 42.7–76)
NRBC BLD AUTO-RTO: 0 /100 WBC (ref 0–0.2)
PLATELET # BLD AUTO: 309 10*3/MM3 (ref 140–450)
POTASSIUM SERPL-SCNC: 5 MMOL/L (ref 3.5–5.2)
PROT SERPL-MCNC: 7.2 G/DL (ref 6–8.5)
RBC # BLD AUTO: 4.54 10*6/MM3 (ref 3.77–5.28)
SODIUM SERPL-SCNC: 142 MMOL/L (ref 136–145)
TSH SERPL DL<=0.005 MIU/L-ACNC: 1.58 UIU/ML (ref 0.27–4.2)
WBC # BLD AUTO: 5.22 10*3/MM3 (ref 3.4–10.8)

## 2020-12-10 PROCEDURE — 99204 OFFICE O/P NEW MOD 45 MIN: CPT | Performed by: PHYSICIAN ASSISTANT

## 2020-12-10 RX ORDER — CLOBETASOL PROPIONATE 0.46 MG/ML
SOLUTION TOPICAL 2 TIMES DAILY
COMMUNITY

## 2020-12-10 NOTE — PROGRESS NOTES
Subjective   Chief Complaint   Patient presents with   • Establish Care     NP- alopecia        History of Present Illness      Pt is a 25 year old female who presents today to [facility] as a new patient. She was just diagnosed with Alopecia by her dermatologist and started on Clobetasol twice daily. She was already taking Spironolactone for cystic acne. She has a fup with Dermatology in a few weeks.     She has an IUD for birth control.     She has tachycardia a few times per week up into the 150s with her resting. She wears an apple watch and it alerts her. She did address this with her previous PCP and had an EKG. She states she was told it was normal and her TSH was normal. This has been ongoing for about 2 years now.     She does have IBS and has mostly stomach cramping and pain. She has been having RUQ pain and they are going to get a HIDA scan on her for gallbldadder dysfunction    She does have anxiety and took CBD in the past but it was recommended by her previous PCP to not take it. She does not want to start prescription anxiety medication.     Patient Active Problem List   Diagnosis   • Chronic seasonal allergic rhinitis due to pollen   • Acne   • Pain of upper abdomen   • Rectal bleeding   • Other irritable bowel syndrome   • Alopecia   • Anxiety       Allergies   Allergen Reactions   • Amoxicillin Rash   • Penicillins Rash     FROM EARLY CHILDHOOD       Current Outpatient Medications on File Prior to Visit   Medication Sig Dispense Refill   • CALCIUM PO Take  by mouth.     • cetirizine (zyrTEC) 10 MG tablet Take  by mouth.     • clobetasol (TEMOVATE) 0.05 % external solution Apply  topically to the appropriate area as directed 2 (Two) Times a Day.     • fluticasone (FLONASE) 50 MCG/ACT nasal spray 2 sprays into each nostril Daily.     • hyoscyamine (LEVSIN) 0.125 MG SL tablet Take 1 tablet by mouth Every 6 (Six) Hours As Needed for Cramping. 120 tablet 3   • levonorgestrel (KYLEENA) 19.5 MG  intrauterine device IUD 1 each by Intrauterine route.     • montelukast (SINGULAIR) 10 MG tablet Take 10 mg by mouth every night.     • Probiotic Product (PROBIOTIC PO) Take  by mouth.     • spironolactone (ALDACTONE) 100 MG tablet Take 100 mg by mouth.     • vitamin C (ASCORBIC ACID) 500 MG tablet Take 500 mg by mouth Daily.       No current facility-administered medications on file prior to visit.        Past Medical History:   Diagnosis Date   • Acne    • Allergic rhinitis    • Alopecia        Family History   Problem Relation Age of Onset   • Hypertension Mother    • Hypertension Father    • Colon polyps Father    • No Known Problems Maternal Grandmother    • No Known Problems Maternal Grandfather    • Parkinsonism Paternal Grandmother    • Colon polyps Paternal Grandfather        Social History     Socioeconomic History   • Marital status: Single     Spouse name: Not on file   • Number of children: Not on file   • Years of education: Not on file   • Highest education level: Not on file   Tobacco Use   • Smoking status: Never Smoker   • Smokeless tobacco: Never Used   Substance and Sexual Activity   • Alcohol use: Yes     Comment: Social   • Drug use: No   • Sexual activity: Defer       Past Surgical History:   Procedure Laterality Date   • COLONOSCOPY N/A 12/7/2020    Procedure: COLONOSCOPY TO CECUM WITH BIOPSIES;  Surgeon: Qasim Cantor MD;  Location: [facility];  Service: Gastroenterology;  Laterality: N/A;  PRE- ABDOMINAL PAIN, RECTAL BLEEDING  POST- INTERNAL HEMORRHOIDS   • ENDOSCOPY N/A 12/7/2020    Procedure: ESOPHAGOGASTRODUODENOSCOPY WITH BIOPSIES;  Surgeon: Qasim Cantor MD;  Location: [facility] ENDOSCOPY;  Service: Gastroenterology;  Laterality: N/A;  PRE- ABDOMINAL PAIN  POST- NORMAL   • TONSILLECTOMY     • WISDOM TOOTH EXTRACTION      APPRX -[age] Y/O         The following portions of the patient's history were reviewed and updated as appropriate: problem list, allergies, current  "medications, past medical history, past family history, past social history and past surgical history.    Review of Systems   Cardiovascular:        Tachycardia   Skin:        Hair loss   Gastrointestinal: Positive for abdominal pain.   Psychiatric/Behavioral: The patient is nervous/anxious.        Immunization History   Administered Date(s) Administered   • HPV Quadrivalent 07/25/2012, 10/05/2012, 05/03/2013   • Hep A, 2 Dose 02/23/2012, 10/05/2012   • Hep B, Adolescent or Pediatric 1997, 1997, 1997   • Hib (PRP-OMP) 1997, 1997, 1997, 03/16/1998   • IPV 1997, 1997, 03/16/1998, 03/16/2001   • Influenza TIV (IM) 10/26/2017   • MMR 03/16/1998, 03/16/2001   • Meningococcal Conjugate 04/24/2008, 07/03/2013   • Meningococcal MCV4P (Menactra) 04/24/2008, 07/03/2013   • PPD Test 05/30/2019   • Tdap 04/24/2008, 05/07/2018   • Varicella 04/16/1998, 04/24/2008       Objective   Vitals:    12/10/20 0935 12/10/20 1017   BP:  106/70   Temp: 97.3 °F (36.3 °C)    Weight: 68 kg (150 lb)    Height: 160 cm (63\")      Body mass index is 26.57 kg/m².  Physical Exam  Vitals signs reviewed.   Constitutional:       Appearance: She is well-developed.   HENT:      Head: Normocephalic and atraumatic.   Eyes:      Extraocular Movements: Extraocular movements intact.      Conjunctiva/sclera: Conjunctivae normal.      Pupils: Pupils are equal, round, and reactive to light.   Cardiovascular:      Rate and Rhythm: Normal rate and regular rhythm.      Heart sounds: Normal heart sounds, S1 normal and S2 normal.   Pulmonary:      Effort: Pulmonary effort is normal.      Breath sounds: Normal breath sounds.   Abdominal:      General: Abdomen is flat. Bowel sounds are normal.      Palpations: Abdomen is soft.   Skin:     General: Skin is warm.   Neurological:      Mental Status: She is alert.   Psychiatric:         Mood and Affect: Mood normal.         Behavior: Behavior normal.         Thought Content: " Thought content normal.         Judgment: Judgment normal.       ECG 12 Lead    Date/Time: 12/11/2020 4:39 PM  Performed by: Juju Casillas PA-C  Authorized by: Juju Casillas PA-C   Comparison: compared with previous ECG   Similar to previous ECG  Rhythm: sinus arrhythmia  Rate: normal  QRS axis: normal    Clinical impression: non-specific ECG          Assessment/Plan   Diagnoses and all orders for this visit:    1. Tachycardia (Primary)  -     Holter monitor - 48 hour; Future  -     CBC & Differential  -     Magnesium  -     Comprehensive Metabolic Panel  -     TSH  -     ECG 12 Lead    2. Alopecia    3. Anxiety    Workup tachycardia. Her EKG shows sinus arrhythmia , with no clear AV yaya block. Labs as well today.     Continue dermatology recommendations for her alopecia.     Recommended she restart CBD oil for her anxiety.

## 2020-12-11 PROCEDURE — 93000 ELECTROCARDIOGRAM COMPLETE: CPT | Performed by: PHYSICIAN ASSISTANT

## 2020-12-11 NOTE — PROGRESS NOTES
Pathology benign  Proceed to abdominal ultrasound and HIDA scan with [facility]  Office visit Carmen 4 weeks

## 2020-12-14 PROBLEM — L65.9 ALOPECIA: Status: ACTIVE | Noted: 2020-12-14

## 2020-12-14 PROBLEM — F41.9 ANXIETY: Status: ACTIVE | Noted: 2020-12-14

## 2020-12-24 ENCOUNTER — HOSPITAL ENCOUNTER (OUTPATIENT)
Dept: ULTRASOUND IMAGING | Facility: HOSPITAL | Age: 25
Discharge: HOME | End: 2020-12-24
Admitting: INTERNAL MEDICINE

## 2020-12-24 ENCOUNTER — HOSPITAL ENCOUNTER (OUTPATIENT)
Dept: NUCLEAR MEDICINE | Facility: HOSPITAL | Age: 25
Discharge: HOME | End: 2020-12-24

## 2020-12-24 PROCEDURE — 78227 HEPATOBIL SYST IMAGE W/DRUG: CPT

## 2020-12-24 PROCEDURE — 25010000002 SINCALIDE PER 5 MCG: Performed by: INTERNAL MEDICINE

## 2020-12-24 PROCEDURE — 76700 US EXAM ABDOM COMPLETE: CPT

## 2020-12-24 PROCEDURE — A9537 TC99M MEBROFENIN: HCPCS | Performed by: INTERNAL MEDICINE

## 2020-12-24 PROCEDURE — 0 TECHNETIUM TC 99M MEBROFENIN KIT: Performed by: INTERNAL MEDICINE

## 2020-12-24 RX ORDER — KIT FOR THE PREPARATION OF TECHNETIUM TC 99M MEBROFENIN 45 MG/10ML
1 INJECTION, POWDER, LYOPHILIZED, FOR SOLUTION INTRAVENOUS
Status: COMPLETED | OUTPATIENT
Start: 2020-12-24 | End: 2020-12-24

## 2020-12-24 RX ADMIN — MEBROFENIN 1 DOSE: 45 INJECTION, POWDER, LYOPHILIZED, FOR SOLUTION INTRAVENOUS at 07:28

## 2020-12-24 RX ADMIN — SINCALIDE 1.4 MCG: 5 INJECTION, POWDER, LYOPHILIZED, FOR SOLUTION INTRAVENOUS at 08:34

## 2020-12-28 ENCOUNTER — HOSPITAL ENCOUNTER (OUTPATIENT)
Dept: CARDIOLOGY | Facility: HOSPITAL | Age: 25
Discharge: HOME | End: 2020-12-28
Admitting: PHYSICIAN ASSISTANT

## 2020-12-28 ENCOUNTER — APPOINTMENT (OUTPATIENT)
Dept: ULTRASOUND IMAGING | Facility: HOSPITAL | Age: 25
End: 2020-12-28

## 2020-12-28 ENCOUNTER — APPOINTMENT (OUTPATIENT)
Dept: NUCLEAR MEDICINE | Facility: HOSPITAL | Age: 25
End: 2020-12-28

## 2020-12-28 DIAGNOSIS — R00.0 TACHYCARDIA: ICD-10-CM

## 2020-12-28 PROCEDURE — 93225 XTRNL ECG REC<48 HRS REC: CPT

## 2020-12-28 PROCEDURE — 93226 XTRNL ECG REC<48 HR SCAN A/R: CPT

## 2020-12-30 PROCEDURE — 93227 XTRNL ECG REC<48 HR R&I: CPT | Performed by: INTERNAL MEDICINE

## 2020-12-31 DIAGNOSIS — R00.0 TACHYCARDIA: Primary | ICD-10-CM

## 2021-01-06 ENCOUNTER — OFFICE VISIT (OUTPATIENT)
Dept: CARDIOLOGY | Facility: CLINIC | Age: 26
End: 2021-01-06

## 2021-01-06 VITALS
WEIGHT: 151 LBS | OXYGEN SATURATION: 98 % | DIASTOLIC BLOOD PRESSURE: 80 MMHG | HEART RATE: 81 BPM | SYSTOLIC BLOOD PRESSURE: 118 MMHG | HEIGHT: 63 IN | BODY MASS INDEX: 26.75 KG/M2

## 2021-01-06 DIAGNOSIS — R00.2 PALPITATIONS: Primary | ICD-10-CM

## 2021-01-06 DIAGNOSIS — I49.8 SINUS ARRHYTHMIA: ICD-10-CM

## 2021-01-06 PROCEDURE — 99214 OFFICE O/P EST MOD 30 MIN: CPT | Performed by: INTERNAL MEDICINE

## 2021-01-06 PROCEDURE — 93000 ELECTROCARDIOGRAM COMPLETE: CPT | Performed by: INTERNAL MEDICINE

## 2021-01-06 NOTE — PROGRESS NOTES
PATIENTINFORMATION    Date of Office Visit: 2021  Encounter Provider: Indio Noble MD  Place of Service: [facility] CARDIOLOGY  Patient Name: Kathya Ho  : 1995    Subjective:     Encounter Date:2021      Patient ID: Kathya Ho is a 25 y.o. female.    Chief Complaint   Patient presents with   • Rapid Heart Rate     NEW PATIENT     HPI  Ms. Ho is a 25 years old female patient with no significant past medical problems referred to cardiology clinic for evaluation of palpitations and tachycardia.  It all started about 2 years ago and she woke up from a nap with her heart racing persistently with heart rate in the 170s and eventually resolved after 2 hours and she did not need to go to the ER.  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  No known exacerbating or relieving factor and does not happen every day but most days of the week.  She denied any associated symptoms during episodes like presyncope or syncope, shortness of breath, chest pain.  She exercise regularly doing both aerobic activities and weightlifting and she does it for at least 30 minutes without any significant limiting symptoms.  She had a normal thyroid function test done recently.  And she denied any family history of sudden cardiac days or fainting.  She takes Aldactone for acne.  She denied any recreational drug or tobacco use.      ROS   All systems reviewed and negative except as noted in HPI.    Past Medical History:   Diagnosis Date   • Acne    • Allergic rhinitis    • Alopecia        Past Surgical History:   Procedure Laterality Date   • COLONOSCOPY N/A 2020    Procedure: COLONOSCOPY TO CECUM WITH BIOPSIES;  Surgeon: Qasim Cantor MD;  Location: [facility] ENDOSCOPY;  Service: Gastroenterology;  Laterality: N/A;  PRE- ABDOMINAL PAIN, RECTAL BLEEDING  POST-  "INTERNAL HEMORRHOIDS   • ENDOSCOPY N/A 12/7/2020    Procedure: ESOPHAGOGASTRODUODENOSCOPY WITH BIOPSIES;  Surgeon: Qasim Cantor MD;  Location: [facility] ENDOSCOPY;  Service: Gastroenterology;  Laterality: N/A;  PRE- ABDOMINAL PAIN  POST- NORMAL   • TONSILLECTOMY     • WISDOM TOOTH EXTRACTION      APPRX -15-19 Y/O       Social History     Socioeconomic History   • Marital status: Single     Spouse name: Not on file   • Number of children: Not on file   • Years of education: Not on file   • Highest education level: Not on file   Tobacco Use   • Smoking status: Never Smoker   • Smokeless tobacco: Never Used   Substance and Sexual Activity   • Alcohol use: Yes     Comment: Social   • Drug use: No   • Sexual activity: Defer       Family History   Problem Relation Age of Onset   • Hypertension Mother    • Hypertension Father    • Colon polyps Father    • No Known Problems Maternal Grandmother    • No Known Problems Maternal Grandfather    • Parkinsonism Paternal Grandmother    • Colon polyps Paternal Grandfather            ECG 12 Lead    Date/Time: 1/6/2021 9:57 AM  Performed by: Indio Noble MD  Authorized by: Indio Noble MD   Comparison: compared with previous ECG from 12/15/2020  Rhythm: sinus arrhythmia  Rate: normal  Conduction: conduction normal  ST Segments: ST segments normal  T Waves: T waves normal  QRS axis: normal  Other: no other findings    Clinical impression: abnormal EKG               Objective:     /80   Pulse 81   Ht 160 cm (63\")   Wt 68.5 kg (151 lb)   SpO2 98%   BMI 26.75 kg/m²  Body mass index is 26.75 kg/m².     Constitutional:       General: Not in acute distress.     Appearance: Well-developed. Not diaphoretic.   Eyes:      Pupils: Pupils are equal, round, and reactive to light.   HENT:      Head: Normocephalic and atraumatic.   Neck:      Musculoskeletal: Normal range of motion and neck supple.      Thyroid: No thyromegaly.   Pulmonary:      Effort: Pulmonary effort " is normal. No respiratory distress.      Breath sounds: Normal breath sounds. No wheezing. No rales.   Chest:      Chest wall: Not tender to palpatation.   Cardiovascular:      Normal rate. Regular rhythm.      No gallop.   Pulses:     Intact distal pulses.   Edema:     Peripheral edema absent.   Abdominal:      General: Bowel sounds are normal. There is no distension.      Palpations: Abdomen is soft.      Tenderness: There is no guarding.   Musculoskeletal: Normal range of motion.         General: No deformity.   Skin:     General: Skin is warm and dry.      Findings: No rash.   Neurological:      Mental Status: Alert and oriented to person, place, and time.      Cranial Nerves: No cranial nerve deficit.      Deep Tendon Reflexes: Reflexes are normal and symmetric.   Psychiatric:         Judgment: Judgment normal.         Review Of Data: I have reviewed Holter monitor done recently and labs.      Assessment/Plan:         Palpitations    Sinus arrhythmia     Patient's EKG and Holter significant for sinus arrhythmia unless she is having ectopic atrial rhythm very close to the sinus node.  Average heart rate on Holter was in the 80s.   Get echocardiogram otherwise encourage patient to continue exercising.    Diagnosis and plan of care discussed with patient and verbalized understanding.           Indio Noble MD  01/06/21  10:19 EST

## 2021-01-28 ENCOUNTER — HOSPITAL ENCOUNTER (OUTPATIENT)
Dept: CARDIOLOGY | Facility: HOSPITAL | Age: 26
Discharge: HOME | End: 2021-01-28
Admitting: INTERNAL MEDICINE

## 2021-01-28 DIAGNOSIS — R00.2 PALPITATIONS: ICD-10-CM

## 2021-01-28 PROCEDURE — 93306 TTE W/DOPPLER COMPLETE: CPT | Performed by: INTERNAL MEDICINE

## 2021-01-28 PROCEDURE — 93306 TTE W/DOPPLER COMPLETE: CPT

## 2021-01-29 ENCOUNTER — TELEPHONE (OUTPATIENT)
Dept: CARDIOLOGY | Facility: CLINIC | Age: 26
End: 2021-01-29

## 2021-01-29 LAB
AORTIC ARCH: 2 CM
ASCENDING AORTA: 2.3 CM
BH CV ECHO MEAS - ACS: 1.7 CM
BH CV ECHO MEAS - AO MAX PG (FULL): 4.3 MMHG
BH CV ECHO MEAS - AO MAX PG: 9.1 MMHG
BH CV ECHO MEAS - AO MEAN PG (FULL): 2 MMHG
BH CV ECHO MEAS - AO MEAN PG: 5 MMHG
BH CV ECHO MEAS - AO ROOT AREA (BSA CORRECTED): 1.3
BH CV ECHO MEAS - AO ROOT AREA: 3.8 CM^2
BH CV ECHO MEAS - AO ROOT DIAM: 2.2 CM
BH CV ECHO MEAS - AO V2 MAX: 151 CM/SEC
BH CV ECHO MEAS - AO V2 MEAN: 102 CM/SEC
BH CV ECHO MEAS - AO V2 VTI: 29.2 CM
BH CV ECHO MEAS - ASC AORTA: 2.3 CM
BH CV ECHO MEAS - AVA(I,A): 1.9 CM^2
BH CV ECHO MEAS - AVA(I,D): 1.9 CM^2
BH CV ECHO MEAS - AVA(V,A): 1.7 CM^2
BH CV ECHO MEAS - AVA(V,D): 1.7 CM^2
BH CV ECHO MEAS - BSA(HAYCOCK): 1.8 M^2
BH CV ECHO MEAS - BSA: 1.7 M^2
BH CV ECHO MEAS - BZI_BMI: 26.7 KILOGRAMS/M^2
BH CV ECHO MEAS - BZI_METRIC_HEIGHT: 160 CM
BH CV ECHO MEAS - BZI_METRIC_WEIGHT: 68.5 KG
BH CV ECHO MEAS - EDV(MOD-SP2): 104 ML
BH CV ECHO MEAS - EDV(MOD-SP4): 97 ML
BH CV ECHO MEAS - EDV(TEICH): 107.5 ML
BH CV ECHO MEAS - EF(CUBED): 73.1 %
BH CV ECHO MEAS - EF(MOD-BP): 57.1 %
BH CV ECHO MEAS - EF(MOD-SP2): 55.8 %
BH CV ECHO MEAS - EF(MOD-SP4): 60.8 %
BH CV ECHO MEAS - EF(TEICH): 64.7 %
BH CV ECHO MEAS - ESV(MOD-SP2): 46 ML
BH CV ECHO MEAS - ESV(MOD-SP4): 38 ML
BH CV ECHO MEAS - ESV(TEICH): 37.9 ML
BH CV ECHO MEAS - FS: 35.4 %
BH CV ECHO MEAS - IVS/LVPW: 1.1
BH CV ECHO MEAS - IVSD: 0.8 CM
BH CV ECHO MEAS - LAT PEAK E' VEL: 18.4 CM/SEC
BH CV ECHO MEAS - LV DIASTOLIC VOL/BSA (35-75): 56.5 ML/M^2
BH CV ECHO MEAS - LV MASS(C)D: 116.6 GRAMS
BH CV ECHO MEAS - LV MASS(C)DI: 68 GRAMS/M^2
BH CV ECHO MEAS - LV MAX PG: 4.8 MMHG
BH CV ECHO MEAS - LV MEAN PG: 3 MMHG
BH CV ECHO MEAS - LV SYSTOLIC VOL/BSA (12-30): 22.1 ML/M^2
BH CV ECHO MEAS - LV V1 MAX: 110 CM/SEC
BH CV ECHO MEAS - LV V1 MEAN: 79.6 CM/SEC
BH CV ECHO MEAS - LV V1 VTI: 24 CM
BH CV ECHO MEAS - LVIDD: 4.8 CM
BH CV ECHO MEAS - LVIDS: 3.1 CM
BH CV ECHO MEAS - LVLD AP2: 7.9 CM
BH CV ECHO MEAS - LVLD AP4: 7.7 CM
BH CV ECHO MEAS - LVLS AP2: 7 CM
BH CV ECHO MEAS - LVLS AP4: 6.4 CM
BH CV ECHO MEAS - LVOT AREA (M): 2.3 CM^2
BH CV ECHO MEAS - LVOT AREA: 2.3 CM^2
BH CV ECHO MEAS - LVOT DIAM: 1.7 CM
BH CV ECHO MEAS - LVPWD: 0.7 CM
BH CV ECHO MEAS - MED PEAK E' VEL: 11.7 CM/SEC
BH CV ECHO MEAS - MV A DUR: 0.08 SEC
BH CV ECHO MEAS - MV A MAX VEL: 68.1 CM/SEC
BH CV ECHO MEAS - MV DEC SLOPE: 403 CM/SEC^2
BH CV ECHO MEAS - MV DEC TIME: 0.2 SEC
BH CV ECHO MEAS - MV E MAX VEL: 88.7 CM/SEC
BH CV ECHO MEAS - MV E/A: 1.3
BH CV ECHO MEAS - MV MAX PG: 2.9 MMHG
BH CV ECHO MEAS - MV MEAN PG: 1 MMHG
BH CV ECHO MEAS - MV P1/2T MAX VEL: 84.2 CM/SEC
BH CV ECHO MEAS - MV P1/2T: 61.2 MSEC
BH CV ECHO MEAS - MV V2 MAX: 85.4 CM/SEC
BH CV ECHO MEAS - MV V2 MEAN: 57.1 CM/SEC
BH CV ECHO MEAS - MV V2 VTI: 21 CM
BH CV ECHO MEAS - MVA P1/2T LCG: 2.6 CM^2
BH CV ECHO MEAS - MVA(P1/2T): 3.6 CM^2
BH CV ECHO MEAS - MVA(VTI): 2.6 CM^2
BH CV ECHO MEAS - PA ACC TIME: 0.15 SEC
BH CV ECHO MEAS - PA MAX PG (FULL): 3.6 MMHG
BH CV ECHO MEAS - PA MAX PG: 6.4 MMHG
BH CV ECHO MEAS - PA PR(ACCEL): 12.4 MMHG
BH CV ECHO MEAS - PA V2 MAX: 126 CM/SEC
BH CV ECHO MEAS - PULM A REVS DUR: 0.09 SEC
BH CV ECHO MEAS - PULM A REVS VEL: 37.3 CM/SEC
BH CV ECHO MEAS - PULM DIAS VEL: 42.4 CM/SEC
BH CV ECHO MEAS - PULM S/D: 1.5
BH CV ECHO MEAS - PULM SYS VEL: 63.8 CM/SEC
BH CV ECHO MEAS - PVA(V,A): 1.5 CM^2
BH CV ECHO MEAS - PVA(V,D): 1.5 CM^2
BH CV ECHO MEAS - QP/QS: 0.73
BH CV ECHO MEAS - RV MAX PG: 2.7 MMHG
BH CV ECHO MEAS - RV MEAN PG: 2 MMHG
BH CV ECHO MEAS - RV V1 MAX: 82.9 CM/SEC
BH CV ECHO MEAS - RV V1 MEAN: 58.3 CM/SEC
BH CV ECHO MEAS - RV V1 VTI: 17.6 CM
BH CV ECHO MEAS - RVOT AREA: 2.3 CM^2
BH CV ECHO MEAS - RVOT DIAM: 1.7 CM
BH CV ECHO MEAS - SI(AO): 64.7 ML/M^2
BH CV ECHO MEAS - SI(CUBED): 47.1 ML/M^2
BH CV ECHO MEAS - SI(LVOT): 31.7 ML/M^2
BH CV ECHO MEAS - SI(MOD-SP2): 33.8 ML/M^2
BH CV ECHO MEAS - SI(MOD-SP4): 34.4 ML/M^2
BH CV ECHO MEAS - SI(TEICH): 40.6 ML/M^2
BH CV ECHO MEAS - SUP REN AO DIAM: 1.9 CM
BH CV ECHO MEAS - SV(AO): 111 ML
BH CV ECHO MEAS - SV(CUBED): 80.8 ML
BH CV ECHO MEAS - SV(LVOT): 54.5 ML
BH CV ECHO MEAS - SV(MOD-SP2): 58 ML
BH CV ECHO MEAS - SV(MOD-SP4): 59 ML
BH CV ECHO MEAS - SV(RVOT): 39.9 ML
BH CV ECHO MEAS - SV(TEICH): 69.6 ML
BH CV ECHO MEAS - TAPSE (>1.6): 2.5 CM
BH CV ECHO MEASUREMENTS AVERAGE E/E' RATIO: 5.89
BH CV XLRA - RV BASE: 3.4 CM
BH CV XLRA - RV LENGTH: 6.8 CM
BH CV XLRA - RV MID: 3.3 CM
BH CV XLRA - TDI S': 16 CM/SEC
LEFT ATRIUM VOLUME INDEX: 24 ML/M2
MAXIMAL PREDICTED HEART RATE: 197 BPM
SINUS: 2.4 CM
STJ: 2.1 CM
STRESS TARGET HR: 167 BPM

## 2022-08-13 ENCOUNTER — APPOINTMENT (OUTPATIENT)
Dept: GENERAL RADIOLOGY | Facility: HOSPITAL | Age: 27
End: 2022-08-13

## 2022-08-13 ENCOUNTER — HOSPITAL ENCOUNTER (EMERGENCY)
Facility: HOSPITAL | Age: 27
Discharge: HOME | End: 2022-08-13
Attending: EMERGENCY MEDICINE | Admitting: EMERGENCY MEDICINE

## 2022-08-13 VITALS
HEIGHT: 63 IN | BODY MASS INDEX: 26.75 KG/M2 | SYSTOLIC BLOOD PRESSURE: 120 MMHG | OXYGEN SATURATION: 100 % | TEMPERATURE: 97.8 F | DIASTOLIC BLOOD PRESSURE: 68 MMHG | HEART RATE: 101 BPM | RESPIRATION RATE: 18 BRPM

## 2022-08-13 DIAGNOSIS — S93.401A SPRAIN OF RIGHT ANKLE, UNSPECIFIED LIGAMENT, INITIAL ENCOUNTER: Primary | ICD-10-CM

## 2022-08-13 PROCEDURE — 99283 EMERGENCY DEPT VISIT LOW MDM: CPT

## 2022-08-13 PROCEDURE — 73610 X-RAY EXAM OF ANKLE: CPT

## 2022-08-13 RX ORDER — HYDROCODONE BITARTRATE AND ACETAMINOPHEN 5; 325 MG/1; MG/1
1 TABLET ORAL EVERY 6 HOURS PRN
Status: DISCONTINUED | OUTPATIENT
Start: 2022-08-13 | End: 2022-08-13 | Stop reason: HOSPADM

## 2022-08-13 RX ADMIN — HYDROCODONE BITARTRATE AND ACETAMINOPHEN 1 TABLET: 5; 325 TABLET ORAL at 03:28

## 2022-08-13 NOTE — ED PROVIDER NOTES
Time: 03:09 EDT  Arrived by: Private vehicle  Chief Complaint: Ankle injury  History provided by: Patient  History is limited by: N/A    History of Present Illness:  Patient is a 27 y.o. year old female that presents to the emergency department with right ankle pain      Ankle Injury  Location:  Right  Quality:  Aching and throbbing  Severity:  Moderate  Onset quality:  Gradual  Duration:  12 hours  Timing:  Constant  Progression:  Worsening  Chronicity:  New  Context:  Patient was walking and rolled her ankle and felt and heard a loud pop it progressively has gotten more painful.  Relieved by:  Nothing  Worsened by:  Rest.  Tylenol  Ineffective treatments:  Tylenol and rest  Associated symptoms: no chest pain, no fever, no nausea and no shortness of breath        Similar Symptoms Previously: No    Recently seen: No      Patient Care Team  Primary Care Provider: ROSANNE Casillas    Past Medical History:     Allergies   Allergen Reactions   • Amoxicillin Rash   • Penicillins Rash     FROM EARLY CHILDHOOD     Past Medical History:   Diagnosis Date   • Acne    • Allergic rhinitis    • Alopecia      Past Surgical History:   Procedure Laterality Date   • COLONOSCOPY N/A 12/7/2020    Procedure: COLONOSCOPY TO CECUM WITH BIOPSIES;  Surgeon: Qasim Cantor MD;  Location: [facility] ENDOSCOPY;  Service: Gastroenterology;  Laterality: N/A;  PRE- ABDOMINAL PAIN, RECTAL BLEEDING  POST- INTERNAL HEMORRHOIDS   • ENDOSCOPY N/A 12/7/2020    Procedure: ESOPHAGOGASTRODUODENOSCOPY WITH BIOPSIES;  Surgeon: Qasim Cantor MD;  Location: [facility] ENDOSCOPY;  Service: Gastroenterology;  Laterality: N/A;  PRE- ABDOMINAL PAIN  POST- NORMAL   • TONSILLECTOMY     • WISDOM TOOTH EXTRACTION      APPRX -15-19 Y/O     Family History   Problem Relation Age of Onset   • Hypertension Mother    • Hypertension Father    • Colon polyps Father    • No Known Problems Maternal Grandmother    • No Known Problems Maternal Grandfather    • Parkinsonism Paternal  Grandmother    • Colon polyps Paternal Grandfather        Home Medications:  Prior to Admission medications    Medication Sig Start Date End Date Taking? Authorizing Provider   CALCIUM PO Take  by mouth.    Pavel Yoo MD   cetirizine (zyrTEC) 10 MG tablet Take  by mouth.    Pavel Yoo MD   clobetasol (TEMOVATE) 0.05 % external solution Apply  topically to the appropriate area as directed 2 (Two) Times a Day.    Pavel Yoo MD   fluticasone (FLONASE) 50 MCG/ACT nasal spray 2 sprays into each nostril Daily.    Pavel Yoo MD   hyoscyamine (LEVSIN) 0.125 MG SL tablet Take 1 tablet by mouth Every 6 (Six) Hours As Needed for Cramping. 9/28/20   Carmen Yeung APRN   levonorgestrel (KYLEENA) 19.5 MG intrauterine device IUD 1 each by Intrauterine route.    Pavel Yoo MD   montelukast (SINGULAIR) 10 MG tablet Take 10 mg by mouth every night.    Emergency, Nurse [facility], RN   Probiotic Product (PROBIOTIC PO) Take  by mouth.    Pavel Yoo MD   spironolactone (ALDACTONE) 100 MG tablet Take 100 mg by mouth.    Pavel Yoo MD   vitamin C (ASCORBIC ACID) 500 MG tablet Take 500 mg by mouth Daily.    ProviderPavel MD        Social History:   PT  reports that she has never smoked. She has never used smokeless tobacco. She reports current alcohol use. She reports that she does not use drugs.    Record Review:  I have reviewed the patient's records in [facility].     Review of Systems  Review of Systems   Constitutional: Negative for fever.   Respiratory: Negative for shortness of breath.    Cardiovascular: Negative for chest pain.   Gastrointestinal: Negative for nausea.   Musculoskeletal: Positive for arthralgias ( Right ankle), gait problem and joint swelling.   Skin: Negative.    Neurological: Negative for weakness and numbness.   Hematological: Negative.    Psychiatric/Behavioral: Negative.         Physical Exam  /68 (BP Location: Left arm, Patient  "Position: Sitting)   Pulse 101   Temp 97.8 °F (36.6 °C) (Oral)   Resp 18   Ht 160 cm (63\")   SpO2 100%   BMI 26.75 kg/m²     Physical Exam  Vitals and nursing note reviewed.   Constitutional:       Appearance: Normal appearance.   HENT:      Head: Atraumatic.      Nose: Nose normal.   Eyes:      Conjunctiva/sclera: Conjunctivae normal.   Cardiovascular:      Pulses: Normal pulses.   Pulmonary:      Effort: Pulmonary effort is normal.   Musculoskeletal:         General: Swelling ( Right lateral ankle) and tenderness ( Right ankle lateral and anterior) present. No deformity.      Cervical back: Normal range of motion.      Comments: Painful range of motion right ankle   Skin:     General: Skin is warm and dry.      Capillary Refill: Capillary refill takes less than 2 seconds.   Neurological:      Mental Status: She is alert.      Sensory: No sensory deficit.      Motor: No weakness.      Gait: Gait abnormal ( Patient unable to bear weight on right ankle without severe pain).   Psychiatric:         Mood and Affect: Mood normal.         Behavior: Behavior normal.                  ED Course  /68 (BP Location: Left arm, Patient Position: Sitting)   Pulse 101   Temp 97.8 °F (36.6 °C) (Oral)   Resp 18   Ht 160 cm (63\")   SpO2 100%   BMI 26.75 kg/m²   Results for orders placed or performed during the hospital encounter of 01/28/21   Adult Transthoracic Echo Complete W/ Cont if Necessary Per Protocol   Result Value Ref Range    BSA 1.7 m^2    IVSd 0.8 cm    LVIDd 4.8 cm    LVIDs 3.1 cm    LVPWd 0.7 cm    IVS/LVPW 1.1     FS 35.4 %    EDV(Teich) 107.5 ml    ESV(Teich) 37.9 ml    EF(Teich) 64.7 %    EF(cubed) 73.1 %    LV mass(C)d 116.6 grams    LV mass(C)dI 68.0 grams/m^2    SV(Teich) 69.6 ml    SI(Teich) 40.6 ml/m^2    SV(cubed) 80.8 ml    SI(cubed) 47.1 ml/m^2    Ao root diam 2.2 cm    Ao root area 3.8 cm^2    ACS 1.7 cm    asc Aorta Diam 2.3 cm    LVOT diam 1.7 cm    LVOT area 2.3 cm^2    LVOT area(traced) " 2.3 cm^2    RVOT diam 1.7 cm    RVOT area 2.3 cm^2    LVLd ap4 7.7 cm    EDV(MOD-sp4) 97.0 ml    LVLs ap4 6.4 cm    ESV(MOD-sp4) 38.0 ml    EF(MOD-sp4) 60.8 %    LVLd ap2 7.9 cm    EDV(MOD-sp2) 104.0 ml    LVLs ap2 7.0 cm    ESV(MOD-sp2) 46.0 ml    EF(MOD-sp2) 55.8 %    SV(MOD-sp4) 59.0 ml    SI(MOD-sp4) 34.4 ml/m^2    SV(MOD-sp2) 58.0 ml    SI(MOD-sp2) 33.8 ml/m^2    Ao root area (BSA corrected) 1.3     LV Gayle Vol (BSA corrected) 56.5 ml/m^2    LV Sys Vol (BSA corrected) 22.1 ml/m^2    TAPSE (>1.6) 2.5 cm    EF(MOD-bp) 57.1 %    MV A dur 0.08 sec    MV E max niraj 88.7 cm/sec    MV A max niraj 68.1 cm/sec    MV E/A 1.3     MV V2 max 85.4 cm/sec    MV max PG 2.9 mmHg    MV V2 mean 57.1 cm/sec    MV mean PG 1.0 mmHg    MV V2 VTI 21.0 cm    MVA(VTI) 2.6 cm^2    MV P1/2t max niraj 84.2 cm/sec    MV P1/2t 61.2 msec    MVA(P1/2t) 3.6 cm^2    MV dec slope 403.0 cm/sec^2    MV dec time 0.2 sec    Ao pk niraj 151.0 cm/sec    Ao max PG 9.1 mmHg    Ao max PG (full) 4.3 mmHg    Ao V2 mean 102.0 cm/sec    Ao mean PG 5.0 mmHg    Ao mean PG (full) 2.0 mmHg    Ao V2 VTI 29.2 cm    IFEANYI(I,A) 1.9 cm^2    IFEANYI(I,D) 1.9 cm^2    IFEANYI(V,A) 1.7 cm^2    IFEANYI(V,D) 1.7 cm^2    LV V1 max PG 4.8 mmHg    LV V1 mean PG 3.0 mmHg    LV V1 max 110.0 cm/sec    LV V1 mean 79.6 cm/sec    LV V1 VTI 24.0 cm    SV(Ao) 111.0 ml    SI(Ao) 64.7 ml/m^2    SV(LVOT) 54.5 ml    SV(RVOT) 39.9 ml    SI(LVOT) 31.7 ml/m^2    PA V2 max 126.0 cm/sec    PA max PG 6.4 mmHg    PA max PG (full) 3.6 mmHg    BH CV ECHO GEOVANNA - PVA(V,A) 1.5 cm^2    BH CV ECHO GEOVANNA - PVA(V,D) 1.5 cm^2    PA acc time 0.15 sec    RV V1 max PG 2.7 mmHg    RV V1 mean PG 2.0 mmHg    RV V1 max 82.9 cm/sec    RV V1 mean 58.3 cm/sec    RV V1 VTI 17.6 cm    PA pr(Accel) 12.4 mmHg    Pulm Sys Niraj 63.8 cm/sec    Pulm Gayle Niraj 42.4 cm/sec    Pulm S/D 1.5     Qp/Qs 0.73     Pulm A Revs Dur 0.09 sec    Pulm A Revs Niraj 37.3 cm/sec    MVA P1/2T LCG 2.6 cm^2    RV Base 3.4 cm    RV Length 6.8 cm    RV Mid 3.3 cm    Lat  Peak E' Niraj 18.4 cm/sec    Med Peak E' Niraj 11.7 cm/sec    RV S' 16.0 cm/sec     CV ECHO GEOVANNA - BZI_BMI 26.7 kilograms/m^2     CV ECHO GEOVANNA - BSA(HAYCOCK) 1.8 m^2     CV ECHO GEOVANNA - BZI_METRIC_WEIGHT 68.5 kg     CV ECHO GEOVANNA - BZI_METRIC_HEIGHT 160.0 cm    Avg E/e' ratio 5.89     Sinus 2.4 cm    STJ 2.1 cm    Ascending aorta 2.3 cm    Aortic arch 2.0 cm    LA ESV Index (BP) 24.0 mL/m2    Abdo Ao Diam 1.9 cm    Target HR (85%) 167 bpm    Max. Pred. HR (100%) 197 bpm     Medications   HYDROcodone-acetaminophen (NORCO) 5-325 MG per tablet 1 tablet (has no administration in time range)     XR Ankle 3+ View Right    Result Date: 8/13/2022  Narrative: PROCEDURE: XR ANKLE 3+ VW RIGHT  COMPARISON: None.  INDICATIONS: RIGHT ANKLE PAIN.  ROLLED RIGHT ANKLE.  TRAUMA/INJURY.  INITIAL ENCOUNTER.  FINDINGS: 3 views were obtained. No acute fracture or acute malalignment is identified. If symptoms or clinical concerns persist, consider imaging follow-up.      Impression:  No acute fracture or acute malalignment is identified.     COMMENT:  Part of this note is an electronic transcription of spoken language to printed text. The electronic translation/transcription may permit erroneous, or at times, nonsensical (or even sensical) words or phrases to be inadvertently transcribed or omitted; this  has reviewed the note for such errors (as well as additional errors); however, some may still exist.  ISREAL MUNOZ JR, MD       Electronically Signed and Approved By: ISREAL MUNOZ JR, MD on 8/13/2022 at 2:58                Medical Decision Making:                     MDM  Number of Diagnoses or Management Options  Sprain of right ankle, unspecified ligament, initial encounter  Diagnosis management comments: I have spoken with the patient. I have explained the patient´s condition, diagnoses and treatment plan based on the information available to me at this time. I have answered the patient's questions and addressed any  concerns. The patient has a good  understanding of the patient´s diagnosis, condition, and treatment plan as can be expected at this point. The vital signs have been stable. The patient´s condition is stable and appropriate for discharge from the emergency department.      The patient will pursue further outpatient evaluation with the primary care physician or other designated or consulting physician as outlined in the discharge instructions. They are agreeable to this plan of care and follow-up instructions have been explained in detail. The patient has received these instructions in written format and have expressed an understanding of the discharge instructions. The patient is aware that any significant change in condition or worsening of symptoms should prompt an immediate return to this or the closest emergency department or call to 911.         Amount and/or Complexity of Data Reviewed  Tests in the radiology section of CPT®: reviewed and ordered  Tests in the medicine section of CPT®: ordered and reviewed    Risk of Complications, Morbidity, and/or Mortality  Presenting problems: low  Diagnostic procedures: low  Management options: low    Patient Progress  Patient progress: stable       Final diagnoses:   Sprain of right ankle, unspecified ligament, initial encounter        Disposition:  ED Disposition     ED Disposition   Discharge    Condition   Stable    Comment   --              Neha Lane, APRN  08/13/22 0309

## 2022-08-13 NOTE — DISCHARGE INSTRUCTIONS
Your x-ray was negative and did not show any sign of fracture or dislocation.    Rest.  Ice.  Wear Aircast for compression.  Elevate.    Use crutches for ambulation.  No weightbearing for at least 3 days    Take medication as prescribed for pain.    Follow-up with orthopedic doctor if no better

## 2023-05-12 ENCOUNTER — OFFICE VISIT (OUTPATIENT)
Dept: INTERNAL MEDICINE | Facility: CLINIC | Age: 28
End: 2023-05-12
Payer: COMMERCIAL

## 2023-05-12 VITALS
DIASTOLIC BLOOD PRESSURE: 70 MMHG | HEIGHT: 63 IN | TEMPERATURE: 98.1 F | WEIGHT: 166 LBS | BODY MASS INDEX: 29.41 KG/M2 | SYSTOLIC BLOOD PRESSURE: 110 MMHG

## 2023-05-12 DIAGNOSIS — E28.2 PCOS (POLYCYSTIC OVARIAN SYNDROME): Primary | ICD-10-CM

## 2023-05-12 DIAGNOSIS — R53.83 OTHER FATIGUE: ICD-10-CM

## 2023-05-12 LAB
BASOPHILS # BLD AUTO: 0.05 10*3/MM3 (ref 0–0.2)
BASOPHILS NFR BLD AUTO: 0.7 % (ref 0–1.5)
EOSINOPHIL # BLD AUTO: 0.24 10*3/MM3 (ref 0–0.4)
EOSINOPHIL NFR BLD AUTO: 3.4 % (ref 0.3–6.2)
ERYTHROCYTE [DISTWIDTH] IN BLOOD BY AUTOMATED COUNT: 11.8 % (ref 12.3–15.4)
FERRITIN SERPL-MCNC: 84.1 NG/ML (ref 13–150)
HCT VFR BLD AUTO: 40.9 % (ref 34–46.6)
HGB BLD-MCNC: 14.3 G/DL (ref 12–15.9)
IMM GRANULOCYTES # BLD AUTO: 0.01 10*3/MM3 (ref 0–0.05)
IMM GRANULOCYTES NFR BLD AUTO: 0.1 % (ref 0–0.5)
IRON SATN MFR SERPL: 26 % (ref 20–50)
IRON SERPL-MCNC: 107 MCG/DL (ref 37–145)
LYMPHOCYTES # BLD AUTO: 1.53 10*3/MM3 (ref 0.7–3.1)
LYMPHOCYTES NFR BLD AUTO: 21.6 % (ref 19.6–45.3)
MCH RBC QN AUTO: 33.2 PG (ref 26.6–33)
MCHC RBC AUTO-ENTMCNC: 35 G/DL (ref 31.5–35.7)
MCV RBC AUTO: 94.9 FL (ref 79–97)
MONOCYTES # BLD AUTO: 0.62 10*3/MM3 (ref 0.1–0.9)
MONOCYTES NFR BLD AUTO: 8.7 % (ref 5–12)
NEUTROPHILS # BLD AUTO: 4.64 10*3/MM3 (ref 1.7–7)
NEUTROPHILS NFR BLD AUTO: 65.5 % (ref 42.7–76)
NRBC BLD AUTO-RTO: 0 /100 WBC (ref 0–0.2)
PLATELET # BLD AUTO: 281 10*3/MM3 (ref 140–450)
RBC # BLD AUTO: 4.31 10*6/MM3 (ref 3.77–5.28)
TIBC SERPL-MCNC: 417 MCG/DL
UIBC SERPL-MCNC: 310 MCG/DL (ref 112–346)
WBC # BLD AUTO: 7.09 10*3/MM3 (ref 3.4–10.8)

## 2023-05-12 PROCEDURE — 99213 OFFICE O/P EST LOW 20 MIN: CPT | Performed by: PHYSICIAN ASSISTANT

## 2023-05-12 RX ORDER — PSYLLIUM SEED (WITH DEXTROSE)
POWDER (GRAM) ORAL DAILY
COMMUNITY

## 2023-05-12 NOTE — PROGRESS NOTES
Subjective   Chief Complaint   Patient presents with   • Polycystic Ovary Syndrome   • Fatigue       History of Present Illness     Realized she had gained anbout 20 lbs in 6 months. Workouts and diet had not changed. Went to Gyn was diagnosed with PCOS. Her mother has hypothyroidism. She had an elevated testosterone. She saw Dr. Janis andres. Thyroid panel is normal.     Having to nap much more due to fatigue. Her vit d level was low, and a supplement was recommended    Typically sleeps well, if she knows she is going to have trouble sleeping will take CBD 2 mg. It will help. She is working with a dietician who works with PCOS.      Patient Active Problem List   Diagnosis   • Chronic seasonal allergic rhinitis due to pollen   • Acne   • Pain of upper abdomen   • Rectal bleeding   • Other irritable bowel syndrome   • Alopecia   • Anxiety   • PCOS (polycystic ovarian syndrome)       Allergies   Allergen Reactions   • Amoxicillin Rash   • Penicillins Rash     FROM EARLY CHILDHOOD       Current Outpatient Medications on File Prior to Visit   Medication Sig Dispense Refill   • cetirizine (zyrTEC) 10 MG tablet Take  by mouth.     • fluticasone (FLONASE) 50 MCG/ACT nasal spray 2 sprays into the nostril(s) as directed by provider Daily.     • levonorgestrel (KYLEENA) 19.5 MG intrauterine device IUD 1 each by Intrauterine route.     • Prenatal MV-Min-Fe Fum-FA-DHA (PRENATAL 1 PO) Take  by mouth.     • Probiotic Product (PROBIOTIC PO) Take  by mouth.     • Psyllium (Metamucil) wafer wafer Take  by mouth Daily.     • [DISCONTINUED] CALCIUM PO Take  by mouth.     • [DISCONTINUED] clobetasol (TEMOVATE) 0.05 % external solution Apply  topically to the appropriate area as directed 2 (Two) Times a Day.     • [DISCONTINUED] diclofenac (VOLTAREN) 50 MG EC tablet Take 1 tablet by mouth 3 (Three) Times a Day As Needed (Pain). 30 tablet 0   • [DISCONTINUED] hyoscyamine (LEVSIN) 0.125 MG SL tablet Take 1 tablet by mouth Every 6 (Six)  Hours As Needed for Cramping. 120 tablet 3   • [DISCONTINUED] montelukast (SINGULAIR) 10 MG tablet Take 10 mg by mouth every night.     • [DISCONTINUED] spironolactone (ALDACTONE) 100 MG tablet Take 100 mg by mouth.     • [DISCONTINUED] vitamin C (ASCORBIC ACID) 500 MG tablet Take 500 mg by mouth Daily.       No current facility-administered medications on file prior to visit.       Past Medical History:   Diagnosis Date   • Acne    • Allergic rhinitis    • Alopecia        Family History   Problem Relation Age of Onset   • Hypertension Mother    • Hypertension Father    • Colon polyps Father    • No Known Problems Maternal Grandmother    • No Known Problems Maternal Grandfather    • Parkinsonism Paternal Grandmother    • Colon polyps Paternal Grandfather        Social History     Socioeconomic History   • Marital status: Single   Tobacco Use   • Smoking status: Never   • Smokeless tobacco: Never   Vaping Use   • Vaping Use: Never used   Substance and Sexual Activity   • Alcohol use: Yes     Comment: Social   • Drug use: No   • Sexual activity: Defer       Past Surgical History:   Procedure Laterality Date   • COLONOSCOPY N/A 12/7/2020    Procedure: COLONOSCOPY TO CECUM WITH BIOPSIES;  Surgeon: Qasim Cantor MD;  Location: [facility] ENDOSCOPY;  Service: Gastroenterology;  Laterality: N/A;  PRE- ABDOMINAL PAIN, RECTAL BLEEDING  POST- INTERNAL HEMORRHOIDS   • ENDOSCOPY N/A 12/7/2020    Procedure: ESOPHAGOGASTRODUODENOSCOPY WITH BIOPSIES;  Surgeon: Qasim Cantor MD;  Location: [facility] ENDOSCOPY;  Service: Gastroenterology;  Laterality: N/A;  PRE- ABDOMINAL PAIN  POST- NORMAL   • TONSILLECTOMY     • WISDOM TOOTH EXTRACTION      APPRX -15-17 Y/O         The following portions of the patient's history were reviewed and updated as appropriate: problem list, allergies, current medications, past medical history, past family history, past social history and past surgical history.    ROS     See HPI    Immunization History  "  Administered Date(s) Administered   • COVID-19 (PFIZER) Purple Cap Monovalent 01/22/2021, 02/12/2021   • HPV Quadrivalent 07/25/2012, 10/05/2012, 05/03/2013   • Hep A, 2 Dose 02/23/2012, 10/05/2012   • Hep B, Adolescent or Pediatric 1995, [birth year]   • Hib (PRP-OMP) 1995, 1995, 1997, 03/16/1998   • IPV 1995, 1995, 03/16/1998, 03/16/2001   • Influenza TIV (IM) 10/26/2017   • MMR 03/16/1998, 03/16/2001   • Meningococcal Conjugate 04/24/2008, 07/03/2013   • Meningococcal MCV4P (Menactra) 04/24/2008, 07/03/2013   • PPD Test 05/30/2019   • Tdap 04/24/2008, 05/07/2018   • Varicella 04/16/1998, 04/24/2008       Objective   Vitals:    05/12/23 0904   BP: 110/70   Temp: 98.1 °F (36.7 °C)   Weight: 75.3 kg (166 lb)   Height: 160 cm (62.99\")     Body mass index is 29.41 kg/m².  Physical Exam  Vitals reviewed.   Constitutional:       Appearance: She is well-developed.   HENT:      Head: Normocephalic and atraumatic.   Cardiovascular:      Rate and Rhythm: Normal rate and regular rhythm.      Heart sounds: Normal heart sounds, S1 normal and S2 normal.   Pulmonary:      Effort: Pulmonary effort is normal.      Breath sounds: Normal breath sounds.   Skin:     General: Skin is warm.   Neurological:      Mental Status: She is alert.   Psychiatric:         Behavior: Behavior normal.       Assessment & Plan   Diagnoses and all orders for this visit:    1. PCOS (polycystic ovarian syndrome) (Primary)    2. Other fatigue  -     CBC & Differential  -     Iron Profile  -     Ferritin    Reviewed endocrinology notes as well as GYN. Check CBC, iron and ferritin. TSH and B12 have been normal. If labs are normal, consider sleep eval for ongoing fatigue.     Return for Lab Today.           "

## 2023-05-18 ENCOUNTER — TELEPHONE (OUTPATIENT)
Dept: INTERNAL MEDICINE | Facility: CLINIC | Age: 28
End: 2023-05-18
Payer: COMMERCIAL

## 2023-05-18 NOTE — TELEPHONE ENCOUNTER
Caller: Kathya Ho    Relationship to patient: Self    Best call back number: [phone]    Patient is needing: PATIENT IS RETURNING CALL REGARDING SETTING UP SLEEP STUDY. SHE HAS SOME QUESTIONS. SHE IS BUSY THE REST OF THE DAY AND REQUESTS TO BE CALLED 5/19 IF POSSIBLE.

## 2023-05-19 DIAGNOSIS — R53.83 OTHER FATIGUE: Primary | ICD-10-CM

## (undated) DEVICE — KT ORCA ORCAPOD DISP STRL

## (undated) DEVICE — THE TORRENT IRRIGATION SCOPE CONNECTOR IS USED WITH THE TORRENT IRRIGATION TUBING TO PROVIDE IRRIGATION FLUIDS SUCH AS STERILE WATER DURING GASTROINTESTINAL ENDOSCOPIC PROCEDURES WHEN USED IN CONJUNCTION WITH AN IRRIGATION PUMP (OR ELECTROSURGICAL UNIT).: Brand: TORRENT

## (undated) DEVICE — SINGLE-USE BIOPSY FORCEPS: Brand: RADIAL JAW 4

## (undated) DEVICE — ADAPT CLN BIOGUARD AIR/H2O DISP

## (undated) DEVICE — LN SMPL CO2 SHTRM SD STREAM W/M LUER

## (undated) DEVICE — MSK ENDO PORT O2 POM ELITE CURAPLEX A/

## (undated) DEVICE — CANN O2 ETCO2 FITS ALL CONN CO2 SMPL A/ 7IN DISP LF

## (undated) DEVICE — SENSR O2 OXIMAX FNGR A/ 18IN NONSTR

## (undated) DEVICE — TUBING, SUCTION, 1/4" X 10', STRAIGHT: Brand: MEDLINE

## (undated) DEVICE — BITEBLOCK OMNI BLOC